# Patient Record
Sex: MALE | Race: WHITE | NOT HISPANIC OR LATINO | Employment: FULL TIME | RURAL
[De-identification: names, ages, dates, MRNs, and addresses within clinical notes are randomized per-mention and may not be internally consistent; named-entity substitution may affect disease eponyms.]

---

## 2020-08-27 ENCOUNTER — HISTORICAL (OUTPATIENT)
Dept: ADMINISTRATIVE | Facility: HOSPITAL | Age: 45
End: 2020-08-27

## 2020-08-27 LAB
ANION GAP SERPL CALCULATED.3IONS-SCNC: 9 MMOL/L
BACTERIA #/AREA URNS HPF: ABNORMAL /HPF
BASOPHILS # BLD AUTO: 0.03 X10E3/UL (ref 0–0.2)
BASOPHILS NFR BLD AUTO: 0.2 % (ref 0–1)
BILIRUB UR QL STRIP: NEGATIVE MG/DL
BUN SERPL-MCNC: 25 MG/DL (ref 7–18)
CALCIUM SERPL-MCNC: 8.2 MG/DL (ref 8.5–10.1)
CHLORIDE SERPL-SCNC: 103 MMOL/L (ref 101–111)
CLARITY UR: ABNORMAL
CLARITY UR: ABNORMAL
CO2 SERPL-SCNC: 30 MMOL/L (ref 21–32)
COLOR UR: YELLOW
COLOR UR: YELLOW
CREAT SERPL-MCNC: 1.1 MG/DL (ref 0.6–1.3)
EOSINOPHIL # BLD AUTO: 0.05 X10E3/UL (ref 0–0.5)
EOSINOPHIL NFR BLD AUTO: 0.4 % (ref 1–4)
ERYTHROCYTE [DISTWIDTH] IN BLOOD BY AUTOMATED COUNT: 12.6 % (ref 11.5–14.5)
GLUCOSE SERPL-MCNC: 147 MG/DL (ref 74–106)
GLUCOSE UR STRIP-MCNC: NORMAL MG/DL
HCT VFR BLD AUTO: 43.9 % (ref 40–54)
HGB BLD-MCNC: 14.7 G/DL (ref 13.5–18)
KETONES UR STRIP-SCNC: NEGATIVE MG/DL
LEUKOCYTE ESTERASE UR QL STRIP: NEGATIVE LEU/UL
LYMPHOCYTES # BLD AUTO: 0.95 X10E3/UL (ref 1–4.8)
LYMPHOCYTES NFR BLD AUTO: 7.8 % (ref 27–41)
MCH RBC QN AUTO: 30.6 PG (ref 27–31)
MCHC RBC AUTO-ENTMCNC: 33.5 G/DL (ref 32–36)
MCV RBC AUTO: 92 FL (ref 80–96)
MONOCYTES # BLD AUTO: 0.66 X10E3/UL (ref 0–0.8)
MONOCYTES NFR BLD AUTO: 5.4 % (ref 2–6)
MPC BLD CALC-MCNC: 9.5 FL (ref 9.4–12.4)
MUCOUS THREADS #/AREA URNS HPF: ABNORMAL /HPF
NEUTROPHILS # BLD AUTO: 10.56 X10E3/UL (ref 1.8–7.7)
NEUTROPHILS NFR BLD AUTO: 86.2 % (ref 53–65)
NITRITE UR QL STRIP: NEGATIVE
PH UR STRIP: 8.5 PH UNITS (ref 5–8)
PLATELET # BLD AUTO: 303 X10E3/UL (ref 150–400)
POTASSIUM SERPL-SCNC: 3.8 MMOL/L (ref 3.6–5)
PROT UR QL STRIP: 30 MG/DL
RBC # BLD AUTO: 4.8 X10E6/UL (ref 4.6–6.2)
RBC # UR STRIP: ABNORMAL ERY/UL
RBC #/AREA URNS HPF: ABNORMAL /HPF (ref 0–3)
SODIUM SERPL-SCNC: 138 MMOL/L (ref 135–145)
SP GR UR STRIP: 1.02 (ref 1–1.03)
SQUAMOUS #/AREA URNS LPF: ABNORMAL /LPF
UROBILINOGEN UR STRIP-ACNC: 0.2 MG/DL
WBC # BLD AUTO: 12.25 X10E3/UL (ref 4.5–11)
WBC #/AREA URNS HPF: ABNORMAL /HPF (ref 0–5)

## 2021-04-05 ENCOUNTER — HISTORICAL (OUTPATIENT)
Dept: ADMINISTRATIVE | Facility: HOSPITAL | Age: 46
End: 2021-04-05

## 2022-01-04 ENCOUNTER — OFFICE VISIT (OUTPATIENT)
Dept: FAMILY MEDICINE | Facility: CLINIC | Age: 47
End: 2022-01-04
Payer: OTHER GOVERNMENT

## 2022-01-04 VITALS
HEIGHT: 65 IN | DIASTOLIC BLOOD PRESSURE: 84 MMHG | BODY MASS INDEX: 22.49 KG/M2 | HEART RATE: 68 BPM | SYSTOLIC BLOOD PRESSURE: 126 MMHG | WEIGHT: 135 LBS | OXYGEN SATURATION: 98 % | RESPIRATION RATE: 17 BRPM

## 2022-01-04 DIAGNOSIS — J06.9 UPPER RESPIRATORY TRACT INFECTION, UNSPECIFIED TYPE: Primary | ICD-10-CM

## 2022-01-04 DIAGNOSIS — R05.9 COUGH: ICD-10-CM

## 2022-01-04 DIAGNOSIS — R52 BODY ACHES: ICD-10-CM

## 2022-01-04 LAB
CTP QC/QA: YES
FLUAV AG NPH QL: NEGATIVE
FLUBV AG NPH QL: NEGATIVE
SARS-COV-2 AG RESP QL IA.RAPID: NEGATIVE

## 2022-01-04 PROCEDURE — 96372 THER/PROPH/DIAG INJ SC/IM: CPT | Mod: ,,, | Performed by: NURSE PRACTITIONER

## 2022-01-04 PROCEDURE — 96372 PR INJECTION,THERAP/PROPH/DIAG2ST, IM OR SUBCUT: ICD-10-PCS | Mod: ,,, | Performed by: NURSE PRACTITIONER

## 2022-01-04 PROCEDURE — 99203 PR OFFICE/OUTPT VISIT, NEW, LEVL III, 30-44 MIN: ICD-10-PCS | Mod: 25,,, | Performed by: NURSE PRACTITIONER

## 2022-01-04 PROCEDURE — 87428 SARSCOV & INF VIR A&B AG IA: CPT | Mod: QW,,, | Performed by: NURSE PRACTITIONER

## 2022-01-04 PROCEDURE — 99203 OFFICE O/P NEW LOW 30 MIN: CPT | Mod: 25,,, | Performed by: NURSE PRACTITIONER

## 2022-01-04 PROCEDURE — 87428 POCT SARS-COV2 (COVID) WITH FLU ANTIGEN: ICD-10-PCS | Mod: QW,,, | Performed by: NURSE PRACTITIONER

## 2022-01-04 RX ORDER — AZITHROMYCIN 250 MG/1
TABLET, FILM COATED ORAL
Qty: 6 TABLET | Refills: 0 | Status: SHIPPED | OUTPATIENT
Start: 2022-01-04 | End: 2022-01-09

## 2022-01-04 RX ORDER — DEXAMETHASONE SODIUM PHOSPHATE 4 MG/ML
4 INJECTION, SOLUTION INTRA-ARTICULAR; INTRALESIONAL; INTRAMUSCULAR; INTRAVENOUS; SOFT TISSUE
Status: COMPLETED | OUTPATIENT
Start: 2022-01-04 | End: 2022-01-04

## 2022-01-04 RX ORDER — CEFTRIAXONE 1 G/1
1 INJECTION, POWDER, FOR SOLUTION INTRAMUSCULAR; INTRAVENOUS ONCE
Status: COMPLETED | OUTPATIENT
Start: 2022-01-04 | End: 2022-01-04

## 2022-01-04 RX ADMIN — CEFTRIAXONE 1 G: 1 INJECTION, POWDER, FOR SOLUTION INTRAMUSCULAR; INTRAVENOUS at 10:01

## 2022-01-04 RX ADMIN — DEXAMETHASONE SODIUM PHOSPHATE 4 MG: 4 INJECTION, SOLUTION INTRA-ARTICULAR; INTRALESIONAL; INTRAMUSCULAR; INTRAVENOUS; SOFT TISSUE at 10:01

## 2022-01-04 NOTE — PROGRESS NOTES
CAROLE Dias   [address] [place]  [address]      PATIENT NAME: Brooks Retana  : 1975  DATE: 22  MRN: [id number]      Billing Provider: CAROLE Dias  Level of Service:   Patient PCP Information     Provider PCP Type    CAROLE Dias General          Reason for Visit / Chief Complaint: Generalized Body Aches (Started this morning) and Cough (Mild, dry cough)       Update PCP  Update Chief Complaint         History of Present Illness / Problem Focused Workflow     Brooks Retana presents to the clinic with Generalized Body Aches (Started this morning) and Cough (Mild, dry cough)     Patient presents to clinic with c/o cough, congestion, and body aches starting yesterday.       Review of Systems     @Review of Systems   HENT: Positive for nasal congestion, postnasal drip, rhinorrhea and sinus pressure/congestion. Negative for ear pain.    Respiratory: Negative for cough.    Cardiovascular: Negative for chest pain.   Neurological: Positive for headaches.       Medical / Social / Family History     Past Medical History:   Diagnosis Date    Kidney stones        History reviewed. No pertinent surgical history.    Social History    reports that he has never smoked. He has never used smokeless tobacco. He reports current alcohol use. He reports that he does not use drugs.    Family History  's family history includes Cancer in his mother.    Medications and Allergies     Medications  No outpatient medications have been marked as taking for the 22 encounter (Office Visit) with CAROLE Dias.     Current Facility-Administered Medications for the 22 encounter (Office Visit) with CAROLE Dias   Medication Dose Route Frequency Provider Last Rate Last Admin    cefTRIAXone injection 1 g  1 g Intramuscular Once CAROLE Dias        dexamethasone injection 4 mg  4 mg Intramuscular 1 time in Clinic/HOD CAROLE Dias            Allergies  Review of patient's allergies indicates:  No Known Allergies    Physical Examination     Vitals:    01/04/22 0829   BP: 126/84   Pulse: 68   Resp: 17     Physical Exam  Constitutional:       General: He is not in acute distress.     Appearance: Normal appearance.   HENT:      Right Ear: Tympanic membrane is bulging.      Left Ear: Tympanic membrane is erythematous and bulging.      Mouth/Throat:      Pharynx: Posterior oropharyngeal erythema present.   Cardiovascular:      Rate and Rhythm: Normal rate and regular rhythm.   Pulmonary:      Effort: Pulmonary effort is normal. No respiratory distress.      Breath sounds: Normal breath sounds. No wheezing, rhonchi or rales.   Skin:     General: Skin is warm and dry.   Neurological:      Mental Status: He is alert.               Lab Results   Component Value Date    WBC 12.25 (H) 08/27/2020    HGB 14.7 08/27/2020    HCT 43.9 08/27/2020    MCV 92 08/27/2020     08/27/2020          Sodium   Date Value Ref Range Status   08/27/2020 138.0 135.0 - 145.0 mmol/L      Potassium   Date Value Ref Range Status   08/27/2020 3.8 3.6 - 5.0 mmol/L      Chloride   Date Value Ref Range Status   08/27/2020 103.0 101.0 - 111.0 mmol/L      CO2   Date Value Ref Range Status   08/27/2020 30 21 - 32 mmol/L      Glucose   Date Value Ref Range Status   08/27/2020 147 (H) 74 - 106 mg/dL      BUN   Date Value Ref Range Status   08/27/2020 25 (H) 7 - 18 mg/dL      Creatinine   Date Value Ref Range Status   08/27/2020 1.1 0.6 - 1.3 mg/dL      Calcium   Date Value Ref Range Status   08/27/2020 8.2 (L) 8.5 - 10.1 mg/dL      Anion Gap   Date Value Ref Range Status   08/27/2020 9       eGFR    Date Value Ref Range Status   08/27/2020 93       Comment:     The above 11 analytes were performed by [facility] [address] [place] [address]     eGFR   Date Value Ref Range Status   08/27/2020 77        X-Ray KUB  Narrative: History: Left sided abdominal pain. Flank  pain    Date: 4/5/2021     Study: KUB    Comparison exam: No previous    Bowel gas pattern is nonobstructive without gross mass lesion. There is  a mild to moderate amount of retained stool in the colon. There is an 8  mm calculus overlying the lower left kidney. Phleboliths overlie the  pelvis. There is no acute osseous abnormality  Impression: Impression: Left nephrolithiasis    This report has been electronically signed by Nhan Valdez     Procedures   Assessment and Plan (including Health Maintenance)      Problem List  Smart Sets  Document Outside HM   :    Plan:           Problem List Items Addressed This Visit    None     Visit Diagnoses     Upper respiratory tract infection, unspecified type    -  Primary    Relevant Medications    cefTRIAXone injection 1 g (Start on 1/4/2022 11:00 AM)    dexamethasone injection 4 mg (Start on 1/4/2022 10:00 AM)    azithromycin (Z-GILL) 250 MG tablet    Cough        Relevant Orders    POCT SARS-COV2 (COVID) with Flu Antigen (Completed)    Body aches        Relevant Orders    POCT SARS-COV2 (COVID) with Flu Antigen (Completed)          Health Maintenance Topics with due status: Not Due       Topic Last Completion Date    TETANUS VACCINE 02/09/2013       No future appointments.     Health Maintenance Due   Topic Date Due    Hepatitis C Screening  Never done    Lipid Panel  Never done    HIV Screening  Never done    Colorectal Cancer Screening  Never done    COVID-19 Vaccine (3 - Booster for Moderna series) 10/02/2021        Follow up if symptoms worsen or fail to improve.     Signature:  CAROLE Dias  [facility]  [address] [address]  [place]  [address]    Date of encounter: 1/4/22

## 2022-01-13 ENCOUNTER — OFFICE VISIT (OUTPATIENT)
Dept: FAMILY MEDICINE | Facility: CLINIC | Age: 47
End: 2022-01-13
Payer: OTHER GOVERNMENT

## 2022-01-13 VITALS
OXYGEN SATURATION: 97 % | SYSTOLIC BLOOD PRESSURE: 122 MMHG | BODY MASS INDEX: 22.49 KG/M2 | WEIGHT: 135 LBS | TEMPERATURE: 96 F | DIASTOLIC BLOOD PRESSURE: 84 MMHG | RESPIRATION RATE: 16 BRPM | HEIGHT: 65 IN | HEART RATE: 71 BPM

## 2022-01-13 DIAGNOSIS — R09.81 HEAD CONGESTION: ICD-10-CM

## 2022-01-13 DIAGNOSIS — J06.9 UPPER RESPIRATORY TRACT INFECTION, UNSPECIFIED TYPE: Primary | ICD-10-CM

## 2022-01-13 DIAGNOSIS — R09.81 SINUS CONGESTION: ICD-10-CM

## 2022-01-13 DIAGNOSIS — R51.9 ACUTE NONINTRACTABLE HEADACHE, UNSPECIFIED HEADACHE TYPE: ICD-10-CM

## 2022-01-13 LAB
CTP QC/QA: YES
SARS-COV-2 AG RESP QL IA.RAPID: NEGATIVE

## 2022-01-13 PROCEDURE — 87426 SARSCOV CORONAVIRUS AG IA: CPT | Mod: QW,,, | Performed by: NURSE PRACTITIONER

## 2022-01-13 PROCEDURE — 87426 SARS CORONAVIRUS 2 ANTIGEN POCT: ICD-10-PCS | Mod: QW,,, | Performed by: NURSE PRACTITIONER

## 2022-01-13 PROCEDURE — 96372 PR INJECTION,THERAP/PROPH/DIAG2ST, IM OR SUBCUT: ICD-10-PCS | Mod: ,,, | Performed by: NURSE PRACTITIONER

## 2022-01-13 PROCEDURE — 96372 THER/PROPH/DIAG INJ SC/IM: CPT | Mod: ,,, | Performed by: NURSE PRACTITIONER

## 2022-01-13 PROCEDURE — 99213 OFFICE O/P EST LOW 20 MIN: CPT | Mod: 25,,, | Performed by: NURSE PRACTITIONER

## 2022-01-13 PROCEDURE — 99213 PR OFFICE/OUTPT VISIT, EST, LEVL III, 20-29 MIN: ICD-10-PCS | Mod: 25,,, | Performed by: NURSE PRACTITIONER

## 2022-01-13 RX ORDER — METHYLPREDNISOLONE ACETATE 40 MG/ML
40 INJECTION, SUSPENSION INTRA-ARTICULAR; INTRALESIONAL; INTRAMUSCULAR; SOFT TISSUE
Status: COMPLETED | OUTPATIENT
Start: 2022-01-13 | End: 2022-01-13

## 2022-01-13 RX ORDER — FEXOFENADINE HCL AND PSEUDOEPHEDRINE HCI 180; 240 MG/1; MG/1
1 TABLET, EXTENDED RELEASE ORAL DAILY
Qty: 10 TABLET | Refills: 0 | Status: SHIPPED | OUTPATIENT
Start: 2022-01-13 | End: 2022-01-23

## 2022-01-13 RX ORDER — METHYLPREDNISOLONE 4 MG/1
TABLET ORAL
Qty: 21 EACH | Refills: 0 | Status: SHIPPED | OUTPATIENT
Start: 2022-01-13 | End: 2022-02-03

## 2022-01-13 RX ORDER — METHYLPREDNISOLONE ACETATE 80 MG/ML
80 INJECTION, SUSPENSION INTRA-ARTICULAR; INTRALESIONAL; INTRAMUSCULAR; SOFT TISSUE
Status: DISCONTINUED | OUTPATIENT
Start: 2022-01-13 | End: 2022-01-13

## 2022-01-13 RX ORDER — CEFDINIR 300 MG/1
300 CAPSULE ORAL 2 TIMES DAILY
Qty: 20 CAPSULE | Refills: 0 | Status: SHIPPED | OUTPATIENT
Start: 2022-01-13 | End: 2022-01-23

## 2022-01-13 RX ORDER — LINCOMYCIN HYDROCHLORIDE 300 MG/ML
600 INJECTION, SOLUTION INTRAMUSCULAR; INTRAVENOUS; SUBCONJUNCTIVAL
Status: COMPLETED | OUTPATIENT
Start: 2022-01-13 | End: 2022-01-13

## 2022-01-13 RX ADMIN — LINCOMYCIN HYDROCHLORIDE 600 MG: 300 INJECTION, SOLUTION INTRAMUSCULAR; INTRAVENOUS; SUBCONJUNCTIVAL at 09:01

## 2022-01-13 RX ADMIN — METHYLPREDNISOLONE ACETATE 40 MG: 40 INJECTION, SUSPENSION INTRA-ARTICULAR; INTRALESIONAL; INTRAMUSCULAR; SOFT TISSUE at 09:01

## 2022-01-13 NOTE — PROGRESS NOTES
CAROLE Wilkes   [place] [address] [address]  [phone]      PATIENT NAME: Brooks Retana  : 1975  DATE: 22  MRN: [id number]      Billing Provider: CAROLE Wilkes  Level of Service:   Patient PCP Information     Provider PCP Type    CAROLE Dias General          Reason for Visit / Chief Complaint: Nasal Congestion, Headache (Since yesterday, will no go away.), Cough (Cough last week, has gotten better, tested for COVID and Flu last week but was negative. Was treated for URI with a shot and Zpak, has completed that.), Otalgia, and Sore Throat       Update PCP  Update Chief Complaint         History of Present Illness / Problem Focused Workflow       Presents with complaints of head and nasal congestion, headache, cough, ear congestion, sore throat for about a week  He was treated for upper respiratory infection about a week ago and has not completely gotten over symptoms  Has concerns of covid exposure       Review of Systems     Review of Systems   Constitutional: Positive for fatigue. Negative for chills and fever.   HENT: Positive for congestion and sore throat. Negative for rhinorrhea.    Eyes: Negative for discharge.   Respiratory: Positive for cough. Negative for shortness of breath.    Cardiovascular: Negative for palpitations.   Gastrointestinal: Negative for abdominal pain, diarrhea and nausea.   Musculoskeletal: Negative for gait problem.   Skin: Negative for rash.   Neurological: Positive for headaches. Negative for dizziness and weakness.   Hematological: Negative for adenopathy.   Psychiatric/Behavioral: Negative for dysphoric mood. The patient is not nervous/anxious.        Medical / Social / Family History     Past Medical History:   Diagnosis Date    Kidney stones        History reviewed. No pertinent surgical history.    Social History    reports that he has never smoked. He has never used smokeless tobacco. He  reports current alcohol use. He reports that he does not use drugs.    Family History  's family history includes Cancer in his mother.    Medications and Allergies     Medications  No outpatient medications have been marked as taking for the 1/13/22 encounter (Office Visit) with CAROLE Wilkes.     Current Facility-Administered Medications for the 1/13/22 encounter (Office Visit) with CAROLE Wilkes   Medication Dose Route Frequency Provider Last Rate Last Admin    [COMPLETED] lincomycin injection 600 mg  600 mg Intramuscular 1 time in Clinic/HOD TITO WilkesP   600 mg at 01/13/22 0940    [COMPLETED] methylPREDNISolone acetate injection 40 mg  40 mg Intramuscular 1 time in Clinic/HOD TITO WilkesP   40 mg at 01/13/22 0940    [DISCONTINUED] methylPREDNISolone acetate injection 80 mg  80 mg Intramuscular 1 time in Clinic/HOD CAROLE Wilkes           Allergies  Review of patient's allergies indicates:  No Known Allergies    Physical Examination     Vitals:    01/13/22 0821   BP: 122/84   Pulse: 71   Resp: 16   Temp: 96.1 °F (35.6 °C)     Physical Exam  Constitutional:       General: He is not in acute distress.  HENT:      Head: Normocephalic.      Right Ear: Tympanic membrane normal.      Left Ear: Tympanic membrane normal.      Nose: Congestion present.      Mouth/Throat:      Mouth: Mucous membranes are moist.      Pharynx: Posterior oropharyngeal erythema present.   Eyes:      Extraocular Movements: Extraocular movements intact.   Cardiovascular:      Rate and Rhythm: Normal rate.      Heart sounds: Normal heart sounds.   Pulmonary:      Effort: Pulmonary effort is normal. No respiratory distress.      Breath sounds: No wheezing.   Abdominal:      General: Bowel sounds are normal.   Musculoskeletal:         General: Normal range of motion.      Cervical back: Normal range of motion.   Skin:     General: Skin is warm.   Neurological:      Mental Status: He is alert and oriented to  person, place, and time.   Psychiatric:         Mood and Affect: Mood normal.           Imaging / Labs       Office Visit on 01/13/2022   Component Date Value Ref Range Status    SARS Coronavirus 2 Antigen 01/13/2022 Negative  Negative Final     Acceptable 01/13/2022 Yes   Final       Assessment and Plan (including Health Maintenance)      Problem List  Smart Sets  Document Outside HM   :    Health Maintenance Due   Topic Date Due    Hepatitis C Screening  Never done    Lipid Panel  Never done    HIV Screening  Never done    Colorectal Cancer Screening  Never done    COVID-19 Vaccine (3 - Booster for Moderna series) 10/02/2021       Problem List Items Addressed This Visit        ENT    Upper respiratory tract infection - Primary    Relevant Medications    lincomycin injection 600 mg (Completed)    methylPREDNISolone (MEDROL DOSEPACK) 4 mg tablet    cefdinir (OMNICEF) 300 MG capsule    fexofenadine-pseudoephedrine (ALLEGRA-D 24) 180-240 mg per 24 hr tablet    methylPREDNISolone acetate injection 40 mg (Completed)      Other Visit Diagnoses     Sinus congestion        Relevant Medications    methylPREDNISolone (MEDROL DOSEPACK) 4 mg tablet    fexofenadine-pseudoephedrine (ALLEGRA-D 24) 180-240 mg per 24 hr tablet    Other Relevant Orders    SARS Coronavirus 2 Antigen, POCT (Completed)    Acute nonintractable headache, unspecified headache type        Relevant Orders    SARS Coronavirus 2 Antigen, POCT (Completed)    Head congestion        Relevant Medications    methylPREDNISolone (MEDROL DOSEPACK) 4 mg tablet    fexofenadine-pseudoephedrine (ALLEGRA-D 24) 180-240 mg per 24 hr tablet          Health Maintenance Topics with due status: Not Due       Topic Last Completion Date    TETANUS VACCINE 02/09/2013           Signature:  CAROLE Wilkes  [address] [address]  [phone]    Date of encounter: 1/13/22

## 2022-01-14 PROBLEM — J06.9 UPPER RESPIRATORY TRACT INFECTION: Status: ACTIVE | Noted: 2022-01-14

## 2022-08-10 ENCOUNTER — OFFICE VISIT (OUTPATIENT)
Dept: FAMILY MEDICINE | Facility: CLINIC | Age: 47
End: 2022-08-10
Payer: OTHER GOVERNMENT

## 2022-08-10 ENCOUNTER — HOSPITAL ENCOUNTER (OUTPATIENT)
Dept: RADIOLOGY | Facility: HOSPITAL | Age: 47
Discharge: HOME | End: 2022-08-10
Attending: FAMILY MEDICINE
Payer: OTHER GOVERNMENT

## 2022-08-10 VITALS
TEMPERATURE: 98 F | SYSTOLIC BLOOD PRESSURE: 112 MMHG | DIASTOLIC BLOOD PRESSURE: 74 MMHG | HEART RATE: 68 BPM | WEIGHT: 135.19 LBS | OXYGEN SATURATION: 98 % | RESPIRATION RATE: 20 BRPM | HEIGHT: 65 IN | BODY MASS INDEX: 22.52 KG/M2

## 2022-08-10 DIAGNOSIS — N20.0 KIDNEY STONES: ICD-10-CM

## 2022-08-10 DIAGNOSIS — M54.50 ACUTE LEFT-SIDED LOW BACK PAIN WITHOUT SCIATICA: Primary | ICD-10-CM

## 2022-08-10 DIAGNOSIS — R10.32 LEFT LOWER QUADRANT ABDOMINAL PAIN: ICD-10-CM

## 2022-08-10 DIAGNOSIS — M54.50 ACUTE LEFT-SIDED LOW BACK PAIN WITHOUT SCIATICA: ICD-10-CM

## 2022-08-10 LAB
BILIRUB SERPL-MCNC: ABNORMAL MG/DL
BLOOD URINE, POC: ABNORMAL
COLOR, POC UA: YELLOW
GLUCOSE UR QL STRIP: NEGATIVE
KETONES UR QL STRIP: 80
LEUKOCYTE ESTERASE URINE, POC: NEGA T IVE
NITRITE, POC UA: NEGATIVE
PH, POC UA: 6
PROTEIN, POC: ABNORMAL
SPECIFIC GRAVITY, POC UA: 1.02
UROBILINOGEN, POC UA: 0.2

## 2022-08-10 PROCEDURE — 74176 CT ABD & PELVIS W/O CONTRAST: CPT | Mod: TC

## 2022-08-10 PROCEDURE — 99214 OFFICE O/P EST MOD 30 MIN: CPT | Mod: ,,, | Performed by: FAMILY MEDICINE

## 2022-08-10 PROCEDURE — 99214 PR OFFICE/OUTPT VISIT, EST, LEVL IV, 30-39 MIN: ICD-10-PCS | Mod: ,,, | Performed by: FAMILY MEDICINE

## 2022-08-10 PROCEDURE — 81003 URINALYSIS AUTO W/O SCOPE: CPT | Mod: QW,,, | Performed by: FAMILY MEDICINE

## 2022-08-10 PROCEDURE — 81003 POCT URINALYSIS W/O SCOPE: ICD-10-PCS | Mod: QW,,, | Performed by: FAMILY MEDICINE

## 2022-08-10 RX ORDER — KETOROLAC TROMETHAMINE 10 MG/1
10 TABLET, FILM COATED ORAL EVERY 6 HOURS
Qty: 20 TABLET | Refills: 0 | Status: SHIPPED | OUTPATIENT
Start: 2022-08-10 | End: 2022-08-15

## 2022-08-10 RX ORDER — TAMSULOSIN HYDROCHLORIDE 0.4 MG/1
0.4 CAPSULE ORAL DAILY
Qty: 30 CAPSULE | Refills: 3 | Status: SHIPPED | OUTPATIENT
Start: 2022-08-10 | End: 2023-01-27 | Stop reason: SDUPTHER

## 2022-08-10 NOTE — PROGRESS NOTES
Mendez Richards DO   [facility]  [address] [facility] [address]  [place]  [address]      PATIENT NAME: Brooks Retana  : 1975  DATE: 8/10/22  MRN: [id number]      Billing Provider: Mendez Richards DO  Level of Service:   Patient PCP Information     Provider PCP Type    CAROLE Dias General          Reason for Visit / Chief Complaint: Low-back Pain (Left low back pain x 1 week;He has a history of kidney stones with lithotripsy multiple times in the last 3 years. He did notice some blood in his urine last week. )       Update PCP  Update Chief Complaint         History of Present Illness / Problem Focused Workflow     Brooks Retana presents to the clinic with Low-back Pain (Left low back pain x 1 week;He has a history of kidney stones with lithotripsy multiple times in the last 3 years. He did notice some blood in his urine last week. )     Patient presented to the clinic today with pain in his left flank and back area for approximately 1 week.  He has had kidney stones before and at times he feels like he can fill a kidney stone removed on the left.  He does not have severe pain except for short periods of time.  He does have some mild dysuria.  States the pain is similar to what he has had in the past but not as severe especially on the left.  Patient denies any fever chills or sweats.      Review of Systems     Review of Systems   Constitutional: Negative for activity change, appetite change, chills, fatigue and fever.   HENT: Negative for nasal congestion, ear discharge, ear pain, mouth dryness, mouth sores, postnasal drip, sinus pressure/congestion, sore throat and voice change.    Eyes: Negative for pain, discharge, redness, itching and visual disturbance.   Respiratory: Negative for apnea, cough, chest tightness, shortness of breath and wheezing.    Cardiovascular: Negative for chest pain, palpitations and leg swelling.   Gastrointestinal: Positive for abdominal pain and nausea. Negative  for abdominal distention, anal bleeding, blood in stool, change in bowel habit, constipation, diarrhea, vomiting, reflux and change in bowel habit.   Endocrine: Negative for cold intolerance, heat intolerance, polydipsia, polyphagia and polyuria.   Genitourinary: Positive for frequency. Negative for difficulty urinating, enuresis, erectile dysfunction, genital sores, hematuria and urgency.   Musculoskeletal: Negative for arthralgias, back pain, gait problem, leg pain, myalgias and neck pain.   Integumentary:  Negative for rash, mole/lesion, breast mass and breast discharge.   Allergic/Immunologic: Negative for environmental allergies and food allergies.   Neurological: Negative for dizziness, vertigo, tremors, seizures, syncope, facial asymmetry, speech difficulty, weakness, light-headedness, numbness, headaches, disturbances in coordination, memory loss and coordination difficulties.   Hematological: Negative for adenopathy. Does not bruise/bleed easily.   Psychiatric/Behavioral: Negative for agitation, behavioral problems, confusion, decreased concentration, dysphoric mood, hallucinations, self-injury, sleep disturbance and suicidal ideas. The patient is not nervous/anxious and is not hyperactive.    Breast: Negative for mass      Medical / Social / Family History     Past Medical History:   Diagnosis Date    Kidney stones        Past Surgical History:   Procedure Laterality Date    LITHOTRIPSY         Social History    reports that he has never smoked. He has never used smokeless tobacco. He reports current alcohol use. He reports that he does not use drugs.    Family History  's family history includes Breast cancer in his mother; No Known Problems in his brother, brother, brother, father, sister, sister, and son.    Medications and Allergies     Medications  No outpatient medications have been marked as taking for the 8/10/22 encounter (Office Visit) with Mendez Richards DO.       Allergies  Review of  patient's allergies indicates:  No Known Allergies    Physical Examination     Vitals:    08/10/22 0855   BP: 112/74   Pulse: 68   Resp: 20   Temp: 97.9 °F (36.6 °C)     Physical Exam  Constitutional:       General: He is not in acute distress.     Appearance: Normal appearance. He is normal weight. He is not ill-appearing.   HENT:      Head: Normocephalic.      Nose: Nose normal.      Mouth/Throat:      Mouth: Mucous membranes are moist.      Pharynx: Oropharynx is clear. No oropharyngeal exudate or posterior oropharyngeal erythema.   Eyes:      General: No scleral icterus.        Right eye: No discharge.         Left eye: No discharge.      Conjunctiva/sclera: Conjunctivae normal.      Pupils: Pupils are equal, round, and reactive to light.   Cardiovascular:      Rate and Rhythm: Normal rate and regular rhythm.      Pulses: Normal pulses.      Heart sounds: Normal heart sounds. No murmur heard.  Pulmonary:      Effort: Pulmonary effort is normal.      Breath sounds: Normal breath sounds. No wheezing.   Abdominal:      General: Abdomen is flat. Bowel sounds are normal.      Palpations: There is no mass.      Tenderness: There is no abdominal tenderness. There is no guarding.      Hernia: No hernia is present.   Musculoskeletal:         General: Normal range of motion.      Right lower leg: No edema.      Left lower leg: No edema.   Skin:     General: Skin is warm.      Capillary Refill: Capillary refill takes less than 2 seconds.      Findings: No rash.   Neurological:      General: No focal deficit present.      Mental Status: He is alert and oriented to person, place, and time. Mental status is at baseline.   Psychiatric:         Mood and Affect: Mood normal.         Behavior: Behavior normal.         Thought Content: Thought content normal.         Judgment: Judgment normal.               Lab Results   Component Value Date    WBC 12.25 (H) 08/27/2020    HGB 14.7 08/27/2020    HCT 43.9 08/27/2020    MCV 92  08/27/2020     08/27/2020          Sodium   Date Value Ref Range Status   08/27/2020 138.0 135.0 - 145.0 mmol/L      Potassium   Date Value Ref Range Status   08/27/2020 3.8 3.6 - 5.0 mmol/L      Chloride   Date Value Ref Range Status   08/27/2020 103.0 101.0 - 111.0 mmol/L      CO2   Date Value Ref Range Status   08/27/2020 30 21 - 32 mmol/L      Glucose   Date Value Ref Range Status   08/27/2020 147 (H) 74 - 106 mg/dL      BUN   Date Value Ref Range Status   08/27/2020 25 (H) 7 - 18 mg/dL      Creatinine   Date Value Ref Range Status   08/27/2020 1.1 0.6 - 1.3 mg/dL      Calcium   Date Value Ref Range Status   08/27/2020 8.2 (L) 8.5 - 10.1 mg/dL      Anion Gap   Date Value Ref Range Status   08/27/2020 9       eGFR    Date Value Ref Range Status   08/27/2020 93       Comment:     The above 11 analytes were performed by [facility] [address]     eGFR   Date Value Ref Range Status   08/27/2020 77        X-Ray KUB  Narrative: History: Left sided abdominal pain. Flank pain    Date: 4/5/2021     Study: KUB    Comparison exam: No previous    Bowel gas pattern is nonobstructive without gross mass lesion. There is  a mild to moderate amount of retained stool in the colon. There is an 8  mm calculus overlying the lower left kidney. Phleboliths overlie the  pelvis. There is no acute osseous abnormality  Impression: Impression: Left nephrolithiasis    This report has been electronically signed by Nhan Valdez     Procedures   Assessment and Plan (including Health Maintenance)      Problem List  Smart Sets  Document Outside HM   :    Plan:         Health Maintenance Due   Topic Date Due    Hepatitis C Screening  Never done    Lipid Panel  Never done    HIV Screening  Never done    Colorectal Cancer Screening  Never done    COVID-19 Vaccine (3 - Booster for Moderna series) 09/02/2021       Problem List Items Addressed This Visit        Renal/    Kidney stones    Relevant  Medications    ketorolac (TORADOL) 10 mg tablet    tamsulosin (FLOMAX) 0.4 mg Cap    Other Relevant Orders    CT Abdomen Pelvis  Without Contrast       Orthopedic    Acute left-sided low back pain without sciatica - Primary    Current Assessment & Plan      patient with history of kidney stones and has been having blood in his urine.  Pain is primarily on the left so we will obtain CT scan of his abdomen for renal colic.  Urine did show too numerous to count red blood cells..  Will start him on Toradol 10 mg every 6-8 hours as needed for pain.  Flomax 0.4 twice daily.  He is to follow up after the CT is complete.  Patient may be referred to Urology for lithotripsy           Relevant Medications    ketorolac (TORADOL) 10 mg tablet    tamsulosin (FLOMAX) 0.4 mg Cap    Other Relevant Orders    POCT URINALYSIS W/O SCOPE (Completed)    CT Abdomen Pelvis  Without Contrast      Other Visit Diagnoses     Left lower quadrant abdominal pain        Relevant Orders    CT Abdomen Pelvis  Without Contrast          Health Maintenance Topics with due status: Not Due       Topic Last Completion Date    TETANUS VACCINE 02/09/2013    Influenza Vaccine 11/06/2021       No future appointments.     Follow up in about 4 weeks (around 9/7/2022), or if symptoms worsen or fail to improve.     Signature:  Mendez Richards DO  [address] [place]  [address]    Date of encounter: 8/10/22

## 2022-08-10 NOTE — ASSESSMENT & PLAN NOTE
patient with history of kidney stones and has been having blood in his urine.  Pain is primarily on the left so we will obtain CT scan of his abdomen for renal colic.  Urine did show too numerous to count red blood cells..  Will start him on Toradol 10 mg every 6-8 hours as needed for pain.  Flomax 0.4 twice daily.  He is to follow up after the CT is complete.  Patient may be referred to Urology for lithotripsy

## 2022-08-10 NOTE — PROGRESS NOTES
Urology referral made after discussing CT results with patient.He is established with Dr. Hernandez in [place].

## 2022-11-16 RX ORDER — OSELTAMIVIR PHOSPHATE 75 MG/1
75 CAPSULE ORAL 2 TIMES DAILY
Qty: 10 CAPSULE | Refills: 0 | Status: SHIPPED | OUTPATIENT
Start: 2022-11-16 | End: 2022-11-21

## 2023-01-03 ENCOUNTER — OFFICE VISIT (OUTPATIENT)
Dept: FAMILY MEDICINE | Facility: CLINIC | Age: 48
End: 2023-01-03
Payer: OTHER GOVERNMENT

## 2023-01-03 VITALS
RESPIRATION RATE: 18 BRPM | DIASTOLIC BLOOD PRESSURE: 74 MMHG | WEIGHT: 135 LBS | HEART RATE: 78 BPM | HEIGHT: 65 IN | SYSTOLIC BLOOD PRESSURE: 110 MMHG | OXYGEN SATURATION: 98 % | BODY MASS INDEX: 22.49 KG/M2 | TEMPERATURE: 99 F

## 2023-01-03 DIAGNOSIS — S81.851A DOG BITE OF RIGHT CALF, INITIAL ENCOUNTER: Primary | ICD-10-CM
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
## 2023-01-03 PROCEDURE — 90471 IMMUNIZATION ADMIN: CPT | Mod: ,,, | Performed by: NURSE PRACTITIONER

## 2023-01-03 PROCEDURE — 90715 TDAP VACCINE GREATER THAN OR EQUAL TO 7YO IM: ICD-10-PCS | Mod: ,,, | Performed by: NURSE PRACTITIONER

## 2023-01-03 PROCEDURE — 90471 TDAP VACCINE GREATER THAN OR EQUAL TO 7YO IM: ICD-10-PCS | Mod: ,,, | Performed by: NURSE PRACTITIONER

## 2023-01-03 PROCEDURE — 99213 PR OFFICE/OUTPT VISIT, EST, LEVL III, 20-29 MIN: ICD-10-PCS | Mod: ,,, | Performed by: NURSE PRACTITIONER

## 2023-01-03 PROCEDURE — 90715 TDAP VACCINE 7 YRS/> IM: CPT | Mod: ,,, | Performed by: NURSE PRACTITIONER

## 2023-01-03 PROCEDURE — 99213 OFFICE O/P EST LOW 20 MIN: CPT | Mod: ,,, | Performed by: NURSE PRACTITIONER

## 2023-01-03 RX ORDER — KETOROLAC TROMETHAMINE 10 MG/1
TABLET, FILM COATED ORAL
COMMUNITY
Start: 2022-08-10 | End: 2023-01-27 | Stop reason: SDUPTHER

## 2023-01-04 RX ORDER — AMOXICILLIN AND CLAVULANATE POTASSIUM 875; 125 MG/1; MG/1
1 TABLET, FILM COATED ORAL EVERY 12 HOURS
Qty: 20 TABLET | Refills: 0 | Status: SHIPPED | OUTPATIENT
Start: 2023-01-04 | End: 2023-05-03 | Stop reason: ALTCHOICE

## 2023-01-04 RX ORDER — MUPIROCIN 20 MG/G
OINTMENT TOPICAL 2 TIMES DAILY
Qty: 30 G | Refills: 1 | Status: SHIPPED | OUTPATIENT
Start: 2023-01-04 | End: 2023-05-03 | Stop reason: ALTCHOICE

## 2023-01-22 PROBLEM — S81.851A DOG BITE OF RIGHT CALF: Status: ACTIVE | Noted: 2023-01-22

## 2023-01-22 PROBLEM — J06.9 UPPER RESPIRATORY TRACT INFECTION: Status: RESOLVED | Noted: 2022-01-14 | Resolved: 2023-01-22
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
## 2023-01-22 NOTE — PROGRESS NOTES
Aixa Hernández NP   [facility]  [address]  [place]  [address]      PATIENT NAME: Brooks Retana  : 1975  DATE: 1/3/23  MRN: [id number]      Billing Provider: Aixa Hernández NP  Level of Service: [place] OFFICE/OUTPT VISIT, EST, LEVL III, 20-29 MIN  Patient PCP Information       Provider PCP Type    CAROLE Dias General            Reason for Visit / Chief Complaint: Animal Bite (Pt c/o dog bite to right calf that occurred about 30 minutes ago while jogging in town. Pt states that he does not personally know the dog but he does know who the owner is. Last tetanus vaccine date unknown but he is pretty sure it has been in the last 10 years since he is in [employer]. )       Update PCP  Update Chief Complaint         History of Present Illness / Problem Focused Workflow     Brooks Retana presents to the clinic with Animal Bite (Pt c/o dog bite to right calf that occurred about 30 minutes ago while jogging in town. Pt states that he does not personally know the dog but he does know who the owner is. Last tetanus vaccine date unknown but he is pretty sure it has been in the last 10 years since he is in the [employer]. )     47 year old male presents to clinic with complaints of dog bite to right calf. States he was bitten by a neighbor's dog about 30 mins ago while jogging in town.  Pt states that he does not personally know the dog but he does know who the owner is. Owner reported dog is up to date on rabies vaccine. Patient reports he is unsure of last tetanus vaccine date.       Review of Systems     @Review of Systems   Constitutional:  Negative for activity change, appetite change, fatigue and fever.   HENT:  Negative for nasal congestion, ear pain, rhinorrhea, sinus pressure/congestion and sore throat.    Eyes:  Negative for pain, redness, visual disturbance and eye dryness.   Respiratory:  Negative for cough and shortness of breath.    Cardiovascular:  Negative for  chest pain and leg swelling.   Gastrointestinal:  Negative for abdominal distention, abdominal pain, constipation and diarrhea.   Endocrine: Negative for cold intolerance, heat intolerance and polyuria.   Genitourinary:  Negative for bladder incontinence, dysuria, frequency and urgency.   Musculoskeletal:  Negative for arthralgias, gait problem and myalgias.   Integumentary:  Positive for wound. Negative for color change and rash.   Allergic/Immunologic: Negative for environmental allergies and food allergies.   Neurological:  Negative for dizziness, weakness, light-headedness and headaches.   Psychiatric/Behavioral:  Negative for behavioral problems and sleep disturbance.      Medical / Social / Family History     Past Medical History:   Diagnosis Date    Kidney stones        Past Surgical History:   Procedure Laterality Date    LITHOTRIPSY         Social History    reports that he has never smoked. He has never been exposed to tobacco smoke. He has never used smokeless tobacco. He reports current alcohol use. He reports that he does not use drugs.    Family History  's family history includes Breast cancer in his mother; No Known Problems in his brother, brother, brother, father, sister, sister, and son.    Medications and Allergies     Medications  Outpatient Medications Marked as Taking for the 1/3/23 encounter (Office Visit) with Aixa Hernández NP   Medication Sig Dispense Refill    tamsulosin (FLOMAX) 0.4 mg Cap Take 1 capsule (0.4 mg total) by mouth once daily. 30 capsule 3       Allergies  Review of patient's allergies indicates:  No Known Allergies    Physical Examination     Vitals:    01/03/23 1450   BP: 110/74   Pulse: 78   Resp: 18   Temp: 98.7 °F (37.1 °C)     Physical Exam  Vitals and nursing note reviewed.   HENT:      Head: Normocephalic.      Right Ear: Tympanic membrane normal.      Left Ear: Tympanic membrane normal.      Nose: Nose normal.      Mouth/Throat:      Mouth: Mucous membranes  are moist.      Pharynx: Oropharynx is clear. No posterior oropharyngeal erythema.   Eyes:      Conjunctiva/sclera: Conjunctivae normal.   Cardiovascular:      Rate and Rhythm: Normal rate and regular rhythm.      Pulses: Normal pulses.      Heart sounds: Normal heart sounds.   Pulmonary:      Effort: Pulmonary effort is normal.      Breath sounds: Normal breath sounds.   Abdominal:      General: Abdomen is flat. Bowel sounds are normal. There is no distension.      Palpations: Abdomen is soft.   Musculoskeletal:         General: No swelling or tenderness. Normal range of motion.      Cervical back: Normal range of motion.      Right lower leg: No edema.      Left lower leg: No edema.   Skin:     General: Skin is warm and dry.      Capillary Refill: Capillary refill takes less than 2 seconds.      Findings: Wound present.          Neurological:      Mental Status: He is alert. Mental status is at baseline.   Psychiatric:         Mood and Affect: Mood normal.         Behavior: Behavior normal.             Lab Results   Component Value Date    WBC 12.25 (H) 08/27/2020    HGB 14.7 08/27/2020    HCT 43.9 08/27/2020    MCV 92 08/27/2020     08/27/2020          Sodium   Date Value Ref Range Status   08/27/2020 138.0 135.0 - 145.0 mmol/L      Potassium   Date Value Ref Range Status   08/27/2020 3.8 3.6 - 5.0 mmol/L      Chloride   Date Value Ref Range Status   08/27/2020 103.0 101.0 - 111.0 mmol/L      CO2   Date Value Ref Range Status   08/27/2020 30 21 - 32 mmol/L      Glucose   Date Value Ref Range Status   08/27/2020 147 (H) 74 - 106 mg/dL      BUN   Date Value Ref Range Status   08/27/2020 25 (H) 7 - 18 mg/dL      Creatinine   Date Value Ref Range Status   08/27/2020 1.1 0.6 - 1.3 mg/dL      Calcium   Date Value Ref Range Status   08/27/2020 8.2 (L) 8.5 - 10.1 mg/dL      Anion Gap   Date Value Ref Range Status   08/27/2020 9       eGFR    Date Value Ref Range Status   08/27/2020 93        Comment:     The above 11 analytes were performed by [id number] [place] [address]     eGFR   Date Value Ref Range Status   08/27/2020 77        CT Abdomen Pelvis  Without Contrast  Narrative: EXAMINATION:  CT ABDOMEN PELVIS WITHOUT CONTRAST    CLINICAL HISTORY:  Low back pain, unspecifiedFlank pain, kidney stone suspected;    COMPARISON:  CT abdomen pelvis August 27, 2020    TECHNIQUE:  Multiple axial tomographic images of the abdomen and pelvis were obtained without the use of intravenous contrast.    FINDINGS:  Mild dependent changes of the lungs noted.    No worrisome focal hepatic abnormality demonstrated on submitted images.  Gallbladder appears somewhat contracted.  Visualized pancreas appears unremarkable.  Spleen grossly unremarkable.    Several nonobstructing left renal calculi are present.  These are subcentimeter.  One of these is located within the left renal pelvis and measures up to 6.5 mm.  There is no significant hydronephrosis or convincing ureteral calculus.  Bilateral adrenal glands grossly unremarkable.  Urinary bladder incompletely distended.  Prostate and seminal vesicles grossly unremarkable.    No convincing evidence of gastrointestinal obstruction or acute appendicitis.  Small fat containing periumbilical hernia.  Vasculature grossly unremarkable.  Visualized osseous and surrounding soft tissue structures demonstrate no acute abnormality.  Impression: Several nonobstructing left renal calculi are present. These are subcentimeter. One of these is located within the left renal pelvis and measures up to 6.5 mm.  There is no significant hydronephrosis or convincing ureteral calculus.  Small fat containing periumbilical hernia.  Other/detailed findings as above.    The CT exam was performed using one or more of the following dose    reduction techniques- Automated exposure control, adjustment of the mA    and/or kV according to patient size, and/or use of iterative    reconstructed  technique.    Point of Service: [facility]    Electronically signed by: Wilbert Morrison  Date:    08/10/2022  Time:    14:28     Procedures   Assessment and Plan (including Health Maintenance)      Problem List  Smart Sets  Document Outside HM   :    Plan:           Problem List Items Addressed This Visit          Orthopedic    Dog bite of right calf - Primary    Current Assessment & Plan     Wounds cleaned and covered with dry gauze.   Tdap given in clinic.   Augmentin as ordered.   Instructed patient to keep wounds clean and dry. Apply mupirocin as ordered.   Follow up if symptoms of infection such as redness, swelling, warmth, increased pain, or drainage occurs.          Relevant Medications    amoxicillin-clavulanate 875-125mg (AUGMENTIN) 875-125 mg per tablet    mupirocin (BACTROBAN) 2 % ointment    Other Relevant Orders    (In Office Administered) Tdap Vaccine (Completed)       Health Maintenance Topics with due status: Not Due       Topic Last Completion Date    TETANUS VACCINE 01/03/2023       No future appointments.     Health Maintenance Due   Topic Date Due    Hepatitis C Screening  Never done    Lipid Panel  Never done    HIV Screening  Never done    Colorectal Cancer Screening  Never done    COVID-19 Vaccine (3 - Booster for Moderna series) 05/28/2021    Influenza Vaccine (1) 09/01/2022        No follow-ups on file.     Signature:  Aixa Hernández NP  [address] [place]  [address]    Date of encounter: 1/3/23

## 2023-01-22 NOTE — ASSESSMENT & PLAN NOTE
Wounds cleaned and covered with dry gauze.   Tdap given in clinic.   Augmentin as ordered.   Instructed patient to keep wounds clean and dry. Apply mupirocin as ordered.   Follow up if symptoms of infection such as redness, swelling, warmth, increased pain, or drainage occurs.

## 2023-01-27 ENCOUNTER — OFFICE VISIT (OUTPATIENT)
Dept: FAMILY MEDICINE | Facility: CLINIC | Age: 48
End: 2023-01-27
Payer: OTHER GOVERNMENT

## 2023-01-27 VITALS
WEIGHT: 135.38 LBS | TEMPERATURE: 98 F | HEIGHT: 65 IN | RESPIRATION RATE: 18 BRPM | HEART RATE: 63 BPM | OXYGEN SATURATION: 98 % | BODY MASS INDEX: 22.56 KG/M2 | DIASTOLIC BLOOD PRESSURE: 80 MMHG | SYSTOLIC BLOOD PRESSURE: 132 MMHG

## 2023-01-27 DIAGNOSIS — M54.50 ACUTE LEFT-SIDED LOW BACK PAIN WITHOUT SCIATICA: ICD-10-CM

## 2023-01-27 DIAGNOSIS — N20.0 KIDNEY STONES: Primary | ICD-10-CM

## 2023-01-27 LAB
BILIRUB SERPL-MCNC: ABNORMAL MG/DL
BLOOD URINE, POC: ABNORMAL
COLOR, POC UA: YELLOW
GLUCOSE UR QL STRIP: ABNORMAL
KETONES UR QL STRIP: ABNORMAL
LEUKOCYTE ESTERASE URINE, POC: ABNORMAL
NITRITE, POC UA: ABNORMAL
PH, POC UA: 6.5
PROTEIN, POC: ABNORMAL
SPECIFIC GRAVITY, POC UA: 1.02
UROBILINOGEN, POC UA: 0.2

## 2023-01-27 PROCEDURE — 99213 PR OFFICE/OUTPT VISIT, EST, LEVL III, 20-29 MIN: ICD-10-PCS | Mod: 25,,, | Performed by: FAMILY MEDICINE

## 2023-01-27 PROCEDURE — 99213 OFFICE O/P EST LOW 20 MIN: CPT | Mod: 25,,, | Performed by: FAMILY MEDICINE

## 2023-01-27 PROCEDURE — 81003 URINALYSIS AUTO W/O SCOPE: CPT | Mod: QW,,, | Performed by: FAMILY MEDICINE

## 2023-01-27 PROCEDURE — 82365 CALCULUS SPECTROSCOPY: CPT | Mod: 90,,, | Performed by: CLINICAL MEDICAL LABORATORY

## 2023-01-27 PROCEDURE — 96372 THER/PROPH/DIAG INJ SC/IM: CPT | Mod: ,,, | Performed by: FAMILY MEDICINE

## 2023-01-27 PROCEDURE — 81003 POCT URINALYSIS W/O SCOPE: ICD-10-PCS | Mod: QW,,, | Performed by: FAMILY MEDICINE

## 2023-01-27 PROCEDURE — 96372 PR INJECTION,THERAP/PROPH/DIAG2ST, IM OR SUBCUT: ICD-10-PCS | Mod: ,,, | Performed by: FAMILY MEDICINE

## 2023-01-27 PROCEDURE — 82365 URINARY STONE ANALYSIS: ICD-10-PCS | Mod: 90,,, | Performed by: CLINICAL MEDICAL LABORATORY

## 2023-01-27 RX ORDER — TAMSULOSIN HYDROCHLORIDE 0.4 MG/1
0.4 CAPSULE ORAL DAILY
Qty: 30 CAPSULE | Refills: 3 | Status: SHIPPED | OUTPATIENT
Start: 2023-01-27 | End: 2023-05-03

## 2023-01-27 RX ORDER — KETOROLAC TROMETHAMINE 10 MG/1
10 TABLET, FILM COATED ORAL EVERY 6 HOURS PRN
Qty: 20 TABLET | Refills: 2 | Status: SHIPPED | OUTPATIENT
Start: 2023-01-27 | End: 2023-05-03

## 2023-01-27 RX ORDER — KETOROLAC TROMETHAMINE 30 MG/ML
60 INJECTION, SOLUTION INTRAMUSCULAR; INTRAVENOUS
Status: COMPLETED | OUTPATIENT
Start: 2023-01-27 | End: 2023-01-27

## 2023-01-27 RX ADMIN — KETOROLAC TROMETHAMINE 60 MG: 30 INJECTION, SOLUTION INTRAMUSCULAR; INTRAVENOUS at 09:01

## 2023-01-27 NOTE — PROGRESS NOTES
Mendez Richards DO   [address]  [place]  [address]      PATIENT NAME: Brooks Retana  : 1975  DATE: 23  MRN: [id number]      Billing Provider: Mendez Richards DO  Level of Service:   Patient PCP Information       Provider PCP Type    CAROLE Dias General            Reason for Visit / Chief Complaint: Hypertension (Pt c/o having kidney pain in his left kindy. Pt says it started this morning around this morning.)       Update PCP  Update Chief Complaint         History of Present Illness / Problem Focused Workflow     Brooks Retana presents to the clinic with Hypertension (Pt c/o having kidney pain in his left kindy. Pt says it started this morning around this morning.)     Patient with history of hypertension who also has history of kidney stones and complains of pain in his right flank today.  He denies any blood in his urine currently.  Has had several kidney stones and he is had lithotripsy x2.  Patient states similar to the pain he is had in the past.  States that Toradol normally stops his pain.  He did start taking Flomax this morning but is out of the medication    Hypertension  Pertinent negatives include no chest pain, headaches, neck pain, palpitations or shortness of breath.     Review of Systems     Review of Systems   Constitutional:  Negative for activity change, appetite change, chills, fatigue and fever.   HENT:  Negative for nasal congestion, ear discharge, ear pain, mouth dryness, mouth sores, postnasal drip, sinus pressure/congestion, sore throat and voice change.    Eyes:  Negative for pain, discharge, redness, itching and visual disturbance.   Respiratory:  Negative for apnea, cough, chest tightness, shortness of breath and wheezing.    Cardiovascular:  Negative for chest pain, palpitations and leg swelling.   Gastrointestinal:  Negative for abdominal distention, abdominal pain, anal bleeding, blood in stool, change in bowel habit,  constipation, diarrhea, nausea, vomiting, reflux and change in bowel habit.   Endocrine: Negative for cold intolerance, heat intolerance, polydipsia, polyphagia and polyuria.   Genitourinary:  Positive for difficulty urinating and frequency. Negative for enuresis, erectile dysfunction, genital sores, hematuria and urgency.   Musculoskeletal:  Negative for arthralgias, back pain, gait problem, leg pain, myalgias and neck pain.   Integumentary:  Negative for rash, mole/lesion, breast mass and breast discharge.   Allergic/Immunologic: Negative for environmental allergies and food allergies.   Neurological:  Negative for dizziness, vertigo, tremors, seizures, syncope, facial asymmetry, speech difficulty, weakness, light-headedness, numbness, headaches, coordination difficulties, memory loss and coordination difficulties.   Hematological:  Negative for adenopathy. Does not bruise/bleed easily.   Psychiatric/Behavioral:  Negative for agitation, behavioral problems, confusion, decreased concentration, dysphoric mood, hallucinations, self-injury, sleep disturbance and suicidal ideas. The patient is not nervous/anxious and is not hyperactive.    Breast: Negative for mass    Medical / Social / Family History     Past Medical History:   Diagnosis Date    Kidney stones        Past Surgical History:   Procedure Laterality Date    LITHOTRIPSY         Social History    reports that he has never smoked. He has never been exposed to tobacco smoke. He has never used smokeless tobacco. He reports current alcohol use. He reports that he does not use drugs.    Family History  's family history includes Breast cancer in his mother; No Known Problems in his brother, brother, brother, father, sister, sister, and son.    Medications and Allergies     Medications  Outpatient Medications Marked as Taking for the 1/27/23 encounter (Office Visit) with Mendez Richards, DO   Medication Sig Dispense Refill    [DISCONTINUED] tamsulosin  (FLOMAX) 0.4 mg Cap Take 1 capsule (0.4 mg total) by mouth once daily. 30 capsule 3     Current Facility-Administered Medications for the 1/27/23 encounter (Office Visit) with Mendez Richards DO   Medication Dose Route Frequency Provider Last Rate Last Admin    [COMPLETED] ketorolac injection 60 mg  60 mg Intramuscular 1 time in Clinic/HOD Mendez Richards DO   60 mg at 01/27/23 0910       Allergies  Review of patient's allergies indicates:  No Known Allergies    Physical Examination     Vitals:    01/27/23 0841   BP: 132/80   Pulse: 63   Resp: 18   Temp: 97.9 °F (36.6 °C)     Physical Exam  Constitutional:       General: He is not in acute distress.     Appearance: Normal appearance. He is normal weight. He is not ill-appearing.   Abdominal:      General: Abdomen is flat. Bowel sounds are normal. There is no distension.      Palpations: Abdomen is soft. There is no mass.      Tenderness: There is no abdominal tenderness. There is no right CVA tenderness, left CVA tenderness, guarding or rebound.      Hernia: No hernia is present.   Musculoskeletal:         General: No tenderness. Normal range of motion.   Skin:     General: Skin is warm and dry.      Findings: No rash.   Neurological:      General: No focal deficit present.      Mental Status: He is alert and oriented to person, place, and time.   Psychiatric:         Mood and Affect: Mood normal.         Behavior: Behavior normal.             Lab Results   Component Value Date    WBC 12.25 (H) 08/27/2020    HGB 14.7 08/27/2020    HCT 43.9 08/27/2020    MCV 92 08/27/2020     08/27/2020          Sodium   Date Value Ref Range Status   08/27/2020 138.0 135.0 - 145.0 mmol/L      Potassium   Date Value Ref Range Status   08/27/2020 3.8 3.6 - 5.0 mmol/L      Chloride   Date Value Ref Range Status   08/27/2020 103.0 101.0 - 111.0 mmol/L      CO2   Date Value Ref Range Status   08/27/2020 30 21 - 32 mmol/L      Glucose   Date Value Ref Range Status   08/27/2020  147 (H) 74 - 106 mg/dL      BUN   Date Value Ref Range Status   08/27/2020 25 (H) 7 - 18 mg/dL      Creatinine   Date Value Ref Range Status   08/27/2020 1.1 0.6 - 1.3 mg/dL      Calcium   Date Value Ref Range Status   08/27/2020 8.2 (L) 8.5 - 10.1 mg/dL      Anion Gap   Date Value Ref Range Status   08/27/2020 9       eGFR    Date Value Ref Range Status   08/27/2020 93       Comment:     The above 11 analytes were performed by [id number] [place] [id number]     eGFR   Date Value Ref Range Status   08/27/2020 77        CT Abdomen Pelvis  Without Contrast  Narrative: EXAMINATION:  CT ABDOMEN PELVIS WITHOUT CONTRAST    CLINICAL HISTORY:  Low back pain, unspecifiedFlank pain, kidney stone suspected;    COMPARISON:  CT abdomen pelvis August 27, 2020    TECHNIQUE:  Multiple axial tomographic images of the abdomen and pelvis were obtained without the use of intravenous contrast.    FINDINGS:  Mild dependent changes of the lungs noted.    No worrisome focal hepatic abnormality demonstrated on submitted images.  Gallbladder appears somewhat contracted.  Visualized pancreas appears unremarkable.  Spleen grossly unremarkable.    Several nonobstructing left renal calculi are present.  These are subcentimeter.  One of these is located within the left renal pelvis and measures up to 6.5 mm.  There is no significant hydronephrosis or convincing ureteral calculus.  Bilateral adrenal glands grossly unremarkable.  Urinary bladder incompletely distended.  Prostate and seminal vesicles grossly unremarkable.    No convincing evidence of gastrointestinal obstruction or acute appendicitis.  Small fat containing periumbilical hernia.  Vasculature grossly unremarkable.  Visualized osseous and surrounding soft tissue structures demonstrate no acute abnormality.  Impression: Several nonobstructing left renal calculi are present. These are subcentimeter. One of these is located within the left renal pelvis and  measures up to 6.5 mm.  There is no significant hydronephrosis or convincing ureteral calculus.  Small fat containing periumbilical hernia.  Other/detailed findings as above.    The CT exam was performed using one or more of the following dose    reduction techniques- Automated exposure control, adjustment of the mA    and/or kV according to patient size, and/or use of iterative    reconstructed technique.    Point of Service: [facility]    Electronically signed by: Wilbert Morrison  Date:    08/10/2022  Time:    14:28     Procedures   Assessment and Plan (including Health Maintenance)      Problem List  Smart Sets  Document Outside HM   :    Plan:         Health Maintenance Due   Topic Date Due    Hepatitis C Screening  Never done    Lipid Panel  Never done    HIV Screening  Never done    Colorectal Cancer Screening  Never done    COVID-19 Vaccine (3 - Booster for Moderna series) 05/28/2021    Influenza Vaccine (1) 09/01/2022       Problem List Items Addressed This Visit          Renal/    Kidney stones - Primary    Current Assessment & Plan     Patient with likely kidney stone with right-sided pain.  We will treat him with the Toradol 60 IM today and 10 mg Toradol at home for pain.  Continue Flomax twice daily.  Follow-up on Monday if his pain has not resolved.  No CT scan or x-ray today.  Urinalysis did show blood in his urine         Relevant Medications    ketorolac (TORADOL) 10 mg tablet    tamsulosin (FLOMAX) 0.4 mg Cap    ketorolac injection 60 mg (Completed)    Other Relevant Orders    POCT URINALYSIS W/O SCOPE (Completed)       Orthopedic    Acute left-sided low back pain without sciatica    Relevant Medications    tamsulosin (FLOMAX) 0.4 mg Cap       Health Maintenance Topics with due status: Not Due       Topic Last Completion Date    TETANUS VACCINE 01/03/2023       No future appointments.     Follow up in about 4 days (around 1/31/2023), or if symptoms worsen or fail to improve.      Signature:  Mendez Richards [address] [place]  [address]    Date of encounter: 1/27/23

## 2023-01-27 NOTE — ASSESSMENT & PLAN NOTE
Patient with likely kidney stone with right-sided pain.  We will treat him with the Toradol 60 IM today and 10 mg Toradol at home for pain.  Continue Flomax twice daily.  Follow-up on Monday if his pain has not resolved.  No CT scan or x-ray today.  Urinalysis did show blood in his urine

## 2023-02-08 LAB
CELL MATERIAL STONE EST-MCNT: NORMAL %
SPECIMEN SOURCE: NORMAL

## 2023-04-14 ENCOUNTER — TELEPHONE (OUTPATIENT)
Dept: FAMILY MEDICINE | Facility: CLINIC | Age: 48
End: 2023-04-14
Payer: OTHER GOVERNMENT

## 2023-05-03 ENCOUNTER — OFFICE VISIT (OUTPATIENT)
Dept: FAMILY MEDICINE | Facility: CLINIC | Age: 48
End: 2023-05-03
Payer: OTHER GOVERNMENT

## 2023-05-03 VITALS
DIASTOLIC BLOOD PRESSURE: 80 MMHG | TEMPERATURE: 98 F | SYSTOLIC BLOOD PRESSURE: 118 MMHG | RESPIRATION RATE: 18 BRPM | HEART RATE: 70 BPM | BODY MASS INDEX: 21.66 KG/M2 | OXYGEN SATURATION: 97 % | WEIGHT: 130 LBS | HEIGHT: 65 IN

## 2023-05-03 DIAGNOSIS — R10.9 ACUTE LEFT FLANK PAIN: Primary | ICD-10-CM

## 2023-05-03 PROCEDURE — 99213 PR OFFICE/OUTPT VISIT, EST, LEVL III, 20-29 MIN: ICD-10-PCS | Mod: ,,, | Performed by: NURSE PRACTITIONER

## 2023-05-03 PROCEDURE — 99213 OFFICE O/P EST LOW 20 MIN: CPT | Mod: ,,, | Performed by: NURSE PRACTITIONER

## 2023-05-03 NOTE — ASSESSMENT & PLAN NOTE
Pt states he has Toradol and Flomax at home he can take as needed for stones. We will send him for CT abdomen and pelvis.

## 2023-05-03 NOTE — PROGRESS NOTES
Aixa Hernández NP   [facility]  [address]  [place]  [address]      PATIENT NAME: Brooks Retana  : 1975  DATE: 5/3/23  MRN: [id number]      Billing Provider: Aixa Hernández NP  Level of Service: [place] OFFICE/OUTPT VISIT, EST, LEVL III, 20-29 MIN  Patient PCP Information       Provider PCP Type    Mendez Richards DO General            Reason for Visit / Chief Complaint: Low-back Pain (Pt c/o low back pain. Has a history of kidney stone. )         History of Present Illness / Problem Focused Workflow     Brooks Retana presents to the clinic with Low-back Pain (Pt c/o low back pain. Has a history of kidney stone. )     47 year old male presents to clinic with left flank pain that started a few days ago. He has a history of kidney stones and thinks this may be another stone.       Review of Systems     @Review of Systems   Constitutional:  Negative for activity change, appetite change, fatigue and fever.   HENT:  Negative for nasal congestion, ear pain, rhinorrhea, sinus pressure/congestion and sore throat.    Eyes:  Negative for pain, redness, visual disturbance and eye dryness.   Respiratory:  Negative for cough and shortness of breath.    Cardiovascular:  Negative for chest pain and leg swelling.   Gastrointestinal:  Negative for abdominal distention, abdominal pain, constipation and diarrhea.   Endocrine: Negative for cold intolerance, heat intolerance and polyuria.   Genitourinary:  Negative for bladder incontinence, dysuria, frequency and urgency.   Musculoskeletal:  Positive for back pain. Negative for arthralgias, gait problem and myalgias.   Integumentary:  Negative for color change, rash and wound.   Allergic/Immunologic: Negative for environmental allergies and food allergies.   Neurological:  Negative for dizziness, weakness, light-headedness and headaches.   Psychiatric/Behavioral:  Negative for behavioral problems and sleep disturbance.      Medical / Social / Family  History     Past Medical History:   Diagnosis Date    Kidney stones        Past Surgical History:   Procedure Laterality Date    LITHOTRIPSY         Social History    reports that he has never smoked. He has never been exposed to tobacco smoke. He has never used smokeless tobacco. He reports current alcohol use. He reports that he does not use drugs.    Family History  's family history includes Breast cancer in his mother; No Known Problems in his brother, brother, brother, father, sister, sister, and son.    Medications and Allergies     Medications  No outpatient medications have been marked as taking for the 5/3/23 encounter (Office Visit) with Aixa Hernández NP.       Allergies  Review of patient's allergies indicates:  No Known Allergies    Physical Examination     Vitals:    05/03/23 1340   BP: 118/80   Pulse: 70   Resp: 18   Temp: 97.8 °F (36.6 °C)     Physical Exam  Vitals and nursing note reviewed.   HENT:      Head: Normocephalic.      Right Ear: Tympanic membrane normal.      Left Ear: Tympanic membrane normal.      Nose: Nose normal.      Mouth/Throat:      Mouth: Mucous membranes are moist.      Pharynx: Oropharynx is clear. No posterior oropharyngeal erythema.   Eyes:      Conjunctiva/sclera: Conjunctivae normal.   Cardiovascular:      Rate and Rhythm: Normal rate and regular rhythm.      Pulses: Normal pulses.      Heart sounds: Normal heart sounds.   Pulmonary:      Effort: Pulmonary effort is normal.      Breath sounds: Normal breath sounds.   Abdominal:      General: Abdomen is flat. Bowel sounds are normal. There is no distension.      Palpations: Abdomen is soft.      Tenderness: There is left CVA tenderness.   Musculoskeletal:         General: No swelling or tenderness. Normal range of motion.      Cervical back: Normal range of motion.      Right lower leg: No edema.      Left lower leg: No edema.   Skin:     General: Skin is warm and dry.      Capillary Refill: Capillary refill takes  less than 2 seconds.   Neurological:      Mental Status: He is alert. Mental status is at baseline.   Psychiatric:         Mood and Affect: Mood normal.         Behavior: Behavior normal.             Lab Results   Component Value Date    WBC 12.25 (H) 08/27/2020    HGB 14.7 08/27/2020    HCT 43.9 08/27/2020    MCV 92 08/27/2020     08/27/2020        CMP  Sodium   Date Value Ref Range Status   08/27/2020 138.0 135.0 - 145.0 mmol/L      Potassium   Date Value Ref Range Status   08/27/2020 3.8 3.6 - 5.0 mmol/L      Chloride   Date Value Ref Range Status   08/27/2020 103.0 101.0 - 111.0 mmol/L      CO2   Date Value Ref Range Status   08/27/2020 30 21 - 32 mmol/L      Glucose   Date Value Ref Range Status   08/27/2020 147 (H) 74 - 106 mg/dL      BUN   Date Value Ref Range Status   08/27/2020 25 (H) 7 - 18 mg/dL      Creatinine   Date Value Ref Range Status   08/27/2020 1.1 0.6 - 1.3 mg/dL      Calcium   Date Value Ref Range Status   08/27/2020 8.2 (L) 8.5 - 10.1 mg/dL      Anion Gap   Date Value Ref Range Status   08/27/2020 9       Procedures   Assessment and Plan (including Health Maintenance)   :    Plan:           Problem List Items Addressed This Visit          GI    Acute left flank pain - Primary    Current Assessment & Plan     Pt states he has Toradol and Flomax at home he can take as needed for stones. We will send him for CT abdomen and pelvis.            Relevant Orders    CT Abdomen Pelvis  Without Contrast       Health Maintenance Topics with due status: Not Due       Topic Last Completion Date    Influenza Vaccine 11/06/2021    TETANUS VACCINE 01/03/2023       Future Appointments   Date Time Provider Department Center   5/10/2023  8:30 AM [facility] CT1 [facility] Vinod ESPINOSA        Health Maintenance Due   Topic Date Due    Hepatitis C Screening  Never done    Lipid Panel  Never done    HIV Screening  Never done    Colorectal Cancer Screening  Never done    COVID-19 Vaccine (3 - Booster for  Moderna series) 05/28/2021        No follow-ups on file.     Signature:  Aixa Hernández NP  [address] [place]  [address]    Date of encounter: 5/3/23

## 2023-05-11 ENCOUNTER — HOSPITAL ENCOUNTER (OUTPATIENT)
Dept: RADIOLOGY | Facility: HOSPITAL | Age: 48
Discharge: HOME | End: 2023-05-11
Attending: NURSE PRACTITIONER
Payer: OTHER GOVERNMENT

## 2023-05-11 DIAGNOSIS — R10.9 ACUTE LEFT FLANK PAIN: ICD-10-CM

## 2023-05-11 PROCEDURE — 74176 CT ABD & PELVIS W/O CONTRAST: CPT | Mod: TC

## 2024-08-27 ENCOUNTER — OFFICE VISIT (OUTPATIENT)
Dept: FAMILY MEDICINE | Facility: CLINIC | Age: 49
End: 2024-08-27
Payer: COMMERCIAL

## 2024-08-27 VITALS
WEIGHT: 134.81 LBS | HEIGHT: 65 IN | BODY MASS INDEX: 22.46 KG/M2 | RESPIRATION RATE: 18 BRPM | TEMPERATURE: 99 F | DIASTOLIC BLOOD PRESSURE: 81 MMHG | HEART RATE: 65 BPM | SYSTOLIC BLOOD PRESSURE: 123 MMHG | OXYGEN SATURATION: 97 %

## 2024-08-27 DIAGNOSIS — Z12.5 SCREENING FOR PROSTATE CANCER: ICD-10-CM

## 2024-08-27 DIAGNOSIS — Z13.1 SCREENING FOR DIABETES MELLITUS: ICD-10-CM

## 2024-08-27 DIAGNOSIS — Z12.11 ENCOUNTER FOR COLORECTAL CANCER SCREENING: ICD-10-CM

## 2024-08-27 DIAGNOSIS — Z11.4 SCREENING FOR HIV WITHOUT PRESENCE OF RISK FACTORS: ICD-10-CM

## 2024-08-27 DIAGNOSIS — Z13.220 SCREENING FOR LIPID DISORDERS: ICD-10-CM

## 2024-08-27 DIAGNOSIS — Z12.12 ENCOUNTER FOR COLORECTAL CANCER SCREENING: ICD-10-CM

## 2024-08-27 DIAGNOSIS — Z00.00 ROUTINE GENERAL MEDICAL EXAMINATION AT A HEALTH CARE FACILITY: Primary | ICD-10-CM

## 2024-08-27 DIAGNOSIS — Z11.59 ENCOUNTER FOR HEPATITIS C SCREENING TEST FOR LOW RISK PATIENT: ICD-10-CM

## 2024-08-27 LAB
ALBUMIN SERPL BCP-MCNC: 3.8 G/DL (ref 3.5–5)
ALBUMIN/GLOB SERPL: 1.1 {RATIO}
ALP SERPL-CCNC: 68 U/L (ref 45–115)
ALT SERPL W P-5'-P-CCNC: 21 U/L (ref 16–61)
ANION GAP SERPL CALCULATED.3IONS-SCNC: 12 MMOL/L (ref 7–16)
AST SERPL W P-5'-P-CCNC: 18 U/L (ref 15–37)
BILIRUB SERPL-MCNC: 0.8 MG/DL (ref ?–1.2)
BUN SERPL-MCNC: 17 MG/DL (ref 7–18)
BUN/CREAT SERPL: 20 (ref 6–20)
CALCIUM SERPL-MCNC: 8.8 MG/DL (ref 8.5–10.1)
CHLORIDE SERPL-SCNC: 108 MMOL/L (ref 98–107)
CHOLEST SERPL-MCNC: 177 MG/DL (ref 0–200)
CHOLEST/HDLC SERPL: 2.2 {RATIO}
CO2 SERPL-SCNC: 26 MMOL/L (ref 21–32)
CREAT SERPL-MCNC: 0.86 MG/DL (ref 0.7–1.3)
EGFR (NO RACE VARIABLE) (RUSH/TITUS): 105 ML/MIN/1.73M2
EST. AVERAGE GLUCOSE BLD GHB EST-MCNC: 105 MG/DL
GLOBULIN SER-MCNC: 3.5 G/DL (ref 2–4)
GLUCOSE SERPL-MCNC: 82 MG/DL (ref 74–106)
HBA1C MFR BLD HPLC: 5.3 % (ref 4.5–6.6)
HCV AB SER QL: NORMAL
HDLC SERPL-MCNC: 82 MG/DL (ref 40–60)
HIV 1+O+2 AB SERPL QL: NORMAL
LDLC SERPL CALC-MCNC: 77 MG/DL
NONHDLC SERPL-MCNC: 95 MG/DL
POTASSIUM SERPL-SCNC: 4.5 MMOL/L (ref 3.5–5.1)
PROT SERPL-MCNC: 7.3 G/DL (ref 6.4–8.2)
PSA SERPL-MCNC: 0.91 NG/ML
SODIUM SERPL-SCNC: 141 MMOL/L (ref 136–145)
TRIGL SERPL-MCNC: 91 MG/DL (ref 35–150)
TSH SERPL DL<=0.005 MIU/L-ACNC: 1.57 UIU/ML (ref 0.36–3.74)
VLDLC SERPL-MCNC: 18 MG/DL

## 2024-08-27 PROCEDURE — 3008F BODY MASS INDEX DOCD: CPT | Mod: ,,, | Performed by: NURSE PRACTITIONER

## 2024-08-27 PROCEDURE — 1160F RVW MEDS BY RX/DR IN RCRD: CPT | Mod: ,,, | Performed by: NURSE PRACTITIONER

## 2024-08-27 PROCEDURE — G0103 PSA SCREENING: HCPCS | Mod: ,,, | Performed by: CLINICAL MEDICAL LABORATORY

## 2024-08-27 PROCEDURE — 3074F SYST BP LT 130 MM HG: CPT | Mod: ,,, | Performed by: NURSE PRACTITIONER

## 2024-08-27 PROCEDURE — 3079F DIAST BP 80-89 MM HG: CPT | Mod: ,,, | Performed by: NURSE PRACTITIONER

## 2024-08-27 PROCEDURE — 86803 HEPATITIS C AB TEST: CPT | Mod: ,,, | Performed by: CLINICAL MEDICAL LABORATORY

## 2024-08-27 PROCEDURE — 84443 ASSAY THYROID STIM HORMONE: CPT | Mod: ,,, | Performed by: CLINICAL MEDICAL LABORATORY

## 2024-08-27 PROCEDURE — 1159F MED LIST DOCD IN RCRD: CPT | Mod: ,,, | Performed by: NURSE PRACTITIONER

## 2024-08-27 PROCEDURE — 87389 HIV-1 AG W/HIV-1&-2 AB AG IA: CPT | Mod: ,,, | Performed by: CLINICAL MEDICAL LABORATORY

## 2024-08-27 PROCEDURE — 83036 HEMOGLOBIN GLYCOSYLATED A1C: CPT | Mod: ,,, | Performed by: CLINICAL MEDICAL LABORATORY

## 2024-08-27 PROCEDURE — 85025 COMPLETE CBC W/AUTO DIFF WBC: CPT | Mod: ,,, | Performed by: CLINICAL MEDICAL LABORATORY

## 2024-08-27 PROCEDURE — 99396 PREV VISIT EST AGE 40-64: CPT | Mod: ,,, | Performed by: NURSE PRACTITIONER

## 2024-08-27 PROCEDURE — 80053 COMPREHEN METABOLIC PANEL: CPT | Mod: ,,, | Performed by: CLINICAL MEDICAL LABORATORY

## 2024-08-27 PROCEDURE — 80061 LIPID PANEL: CPT | Mod: ,,, | Performed by: CLINICAL MEDICAL LABORATORY

## 2024-08-27 PROCEDURE — 3044F HG A1C LEVEL LT 7.0%: CPT | Mod: ,,, | Performed by: NURSE PRACTITIONER

## 2024-08-27 NOTE — PROGRESS NOTES
Health Maintenance Due   Topic Date Due    Hepatitis C Screening  Doing today    Lipid Panel  Doing today    HIV Screening  Doing today    Colorectal Cancer Screening  Setting up referral today    COVID-19 Vaccine (3 - 2023-24 season) 09/01/2023    Shingles Vaccine (1 of 2) Patient will check with the [facility] and pharmacy     I have discussed care gaps with patient.

## 2024-08-28 ENCOUNTER — PATIENT MESSAGE (OUTPATIENT)
Dept: FAMILY MEDICINE | Facility: CLINIC | Age: 49
End: 2024-08-28
Payer: COMMERCIAL

## 2024-08-28 ENCOUNTER — PATIENT OUTREACH (OUTPATIENT)
Facility: HOSPITAL | Age: 49
End: 2024-08-28
Payer: COMMERCIAL

## 2024-08-28 LAB
BASOPHILS # BLD AUTO: 0.07 K/UL (ref 0–0.2)
BASOPHILS NFR BLD AUTO: 0.9 % (ref 0–1)
DIFFERENTIAL METHOD BLD: ABNORMAL
EOSINOPHIL # BLD AUTO: 0.12 K/UL (ref 0–0.5)
EOSINOPHIL NFR BLD AUTO: 1.6 % (ref 1–4)
ERYTHROCYTE [DISTWIDTH] IN BLOOD BY AUTOMATED COUNT: 13.3 % (ref 11.5–14.5)
HCT VFR BLD AUTO: 49.7 % (ref 40–54)
HGB BLD-MCNC: 15.6 G/DL (ref 13.5–18)
IMM GRANULOCYTES # BLD AUTO: 0.02 K/UL (ref 0–0.04)
IMM GRANULOCYTES NFR BLD: 0.3 % (ref 0–0.4)
LYMPHOCYTES # BLD AUTO: 2.04 K/UL (ref 1–4.8)
LYMPHOCYTES NFR BLD AUTO: 26.5 % (ref 27–41)
MCH RBC QN AUTO: 30.5 PG (ref 27–31)
MCHC RBC AUTO-ENTMCNC: 31.4 G/DL (ref 32–36)
MCV RBC AUTO: 97.1 FL (ref 80–96)
MONOCYTES # BLD AUTO: 0.69 K/UL (ref 0–0.8)
MONOCYTES NFR BLD AUTO: 9 % (ref 2–6)
MPC BLD CALC-MCNC: 9.6 FL (ref 9.4–12.4)
NEUTROPHILS # BLD AUTO: 4.75 K/UL (ref 1.8–7.7)
NEUTROPHILS NFR BLD AUTO: 61.7 % (ref 53–65)
NRBC # BLD AUTO: 0 X10E3/UL
NRBC, AUTO (.00): 0 %
PLATELET # BLD AUTO: 327 K/UL (ref 150–400)
RBC # BLD AUTO: 5.12 M/UL (ref 4.6–6.2)
WBC # BLD AUTO: 7.69 K/UL (ref 4.5–11)

## 2024-08-28 NOTE — PROGRESS NOTES
Labs reviewed: CMP normal. Cholesterol level looks good. Continue low fat/low cholesterol diet and continue exercise. Hgb A1C normal. HIV, Hep C negative. TSH normal and PSA normal.CBC still pending. Will address when results received.

## 2024-08-28 NOTE — PROGRESS NOTES
Population Health Chart Review & Patient Outreach Details      Further Action Needed If Patient Returns Outreach:            Updates Requested / Reviewed:     []  Care Everywhere    []     []  External Sources ([facility], [facility], [facility], etc.)    [] [facility]   [] [facility]   [] Other:    []  Care Team Updated   []  Removed  or Duplicate Orders   []  Immunization Reconciliation Completed / Queried    [] [place]   [] [place]   [] [place]   [] [place]      Health Maintenance Topics Addressed and Outreach Outcomes / Actions Taken:             Breast Cancer Screening []  Mammogram Order Placed    []  Mammogram Screening Scheduled    []  External Records Requested & Care Team Updated if Applicable    []  External Records Uploaded & Care Team Updated if Applicable    []  Pt Declined Scheduling Mammogram    []  Pt Will Schedule with External Provider / Order Routed & Care Team Updated if Applicable              Cervical Cancer Screening []  Pap Smear Scheduled in Primary Care or OBGYN    []  External Records Requested & Care Team Updated if Applicable       []  External Records Uploaded, Care Team Updated, & History Updated if Applicable    []  Patient Declined Scheduling Pap Smear    []  Patient Will Schedule with External Provider & Care Team Updated if Applicable                  Colorectal Cancer Screening []  Colonoscopy Case Request / Referral / Home Test Order Placed    []  External Records Requested & Care Team Updated if Applicable    []  External Records Uploaded, Care Team Updated, & History Updated if Applicable    []  Patient Declined Completing Colon Cancer Screening    []  Patient Will Schedule with External Provider & Care Team Updated if Applicable    []  Fit Kit Mailed (add the SmartPhrase under additional notes)    []  Reminded Patient to Complete Home Test                Diabetic Eye Exam []  Eye Exam Screening Order Placed    []  Eye Camera Scheduled or Optometry/Ophthalmology Referral  Placed    []  External Records Requested & Care Team Updated if Applicable    []  External Records Uploaded, Care Team Updated, & History Updated if Applicable    []  Patient Declined Scheduling Eye Exam    []  Patient Will Schedule with External Provider & Care Team Updated if Applicable             Blood Pressure Control []  Primary Care Follow Up Visit Scheduled     []  Remote Blood Pressure Reading Captured    []  Patient Declined Remote Reading or Scheduling Appt - Escalated to PCP    []  Patient Will Call Back or Send Portal Message with Reading                 HbA1c & Other Labs []  Overdue Lab(s) Ordered    []  Overdue Lab(s) Scheduled    []  External Records Uploaded & Care Team Updated if Applicable    []  Primary Care Follow Up Visit Scheduled     []  Reminded Patient to Complete A1c Home Test    []  Patient Declined Scheduling Labs or Will Call Back to Schedule    []  Patient Will Schedule with External Provider / Order Routed, & Care Team Updated if Applicable           Primary Care Appointment []  Primary Care Appt Scheduled    []  Patient Declined Scheduling or Will Call Back to Schedule    []  Pt Established with External Provider, Updated Care Team, & Informed Pt to Notify Payor if Applicable           Medication Adherence /    Statin Use []  Primary Care Appointment Scheduled    []  Patient Reminded to  Prescription    []  Patient Declined, Provider Notified if Needed    []  Sent Provider Message to Review to Evaluate Pt for Statin, Add Exclusion Dx Codes, Document   Exclusion in Problem List, Change Statin Intensity Level to Moderate or High Intensity if Applicable                Osteoporosis Screening []  Dexa Order Placed    []  Dexa Appointment Scheduled    []  External Records Requested & Care Team Updated    []  External Records Uploaded, Care Team Updated, & History Updated if Applicable    []  Patient Declined Scheduling Dexa or Will Call Back to Schedule    []  Patient Will Schedule  with External Provider / Order Routed & Care Team Updated if Applicable       Additional Notes:.  Post visit Population Health review of encounter with date of service  8/27/24 with Edgar. Not  All required HY components in encounter. Chart is opened and needs primary dx as z00.00 or z00.01. Also needs CPT for age 49.   Followup appt for: 8/27/25 HY

## 2024-08-28 NOTE — PROGRESS NOTES
CBC reviewed: Normal or clinically insignificant. The abnormal numbers that he sees are as follows: MCV indicates his RBCs are very slightly larger than normal. MCHC is the average amount of hemoglobin in red blood cells. His was very slightly low. However, his RBC and Hgb and Hct were normal so I am not concerned with this. He could start a daily multivitamin with iron or increase iron in his diet. Lymphocytes and monocytes are part of what makes up his white blood cells. They were slightly abnormal but this can be related to any type of viral or bacterial illness or inflammation. His WBC was normal so I am unconcerned with these levels as well.

## 2024-09-04 NOTE — PATIENT INSTRUCTIONS
"Patient Education       Diet and Health   The Basics   Written by the doctors and editors at [facility]   Why is it important to eat a healthy diet? -- It's important to eat a healthy diet because eating the right foods can keep you healthy now and later on in life.  Which foods are especially healthy? -- Foods that are especially healthy include:  Fruits and vegetables - Eating a diet with lots of fruits and vegetables can help prevent heart disease and strokes. It might also help prevent certain types of cancers. Try to eat fruits and vegetables at each meal and also for snacks. If you don't have fresh fruits and vegetables available, you can eat frozen or canned ones instead. Doctors recommend eating at least 2 1/2 servings of vegetables and 2 servings of fruits each day.  Foods with fiber - Eating foods with a lot of fiber can help prevent heart disease and strokes. If you have type 2 diabetes, it can also help control your blood sugar. Foods that have a lot of fiber include vegetables, fruits, beans, nuts, oatmeal, and whole grain breads and cereals. You can tell how much fiber is in a food by reading the nutrition label (figure 1). Doctors recommend eating 25 to 36 grams of fiber each day.  Foods with folate (also called folic acid) - Folate is a vitamin that is important for pregnant people, since it helps prevent certain birth defects. Anyone who could get pregnant should get at least 400 micrograms of folic acid daily, whether or not they are actively trying to get pregnant. Folate is found in many breakfast cereals, oranges, orange juice, and green leafy vegetables.  Foods with calcium and vitamin D - Babies, children, and adults need calcium and vitamin D to help keep their bones strong. Adults also need calcium and vitamin D to help prevent osteoporosis. Osteoporosis is a condition that causes bones to get "thin" and break more easily than usual. Different foods and drinks have calcium and vitamin D in " Pt trying to urinate - uncooperative again w/ staff, grabbing, hit staff 1:1 NST in chest, security called, MD notified.   "them (figure 2). People who don't get enough calcium and vitamin D in their diet might need to take a supplement.  Foods with protein - Protein helps your muscles stay strong. Healthy foods with a lot of protein include chicken, fish, eggs, beans, nuts, and soy products. Red meat also has a lot of protein, but it also contains fats, which can be unhealthy.  Some experts recommend a "Mediterranean diet." This involves eating a lot of fruits, vegetables, nuts, whole grains, and olive oil. It also includes some fish, poultry, and dairy products, but not a lot of red meat. Eating this way can help your overall health, and might even lower your risk of having a stroke.  What foods should I avoid or limit? -- To eat a healthy diet, there are some things you should avoid or limit. They include:   Fats - There are different types of fats. Some types of fats are better for your body than others.  Trans fats are especially unhealthy. They are found in margarines, many fast foods, and some store-bought baked goods. Trans fats can raise your cholesterol level and your increase your chance of getting heart disease. You should avoid eating foods with these types of fats.  The type of "polyunsaturated" fats found in fish seems to be healthy and can reduce your chance of getting heart disease. Other polyunsaturated fats might also be good for your health. When you cook, it's best to use oils with healthier fats, such as olive oil and canola oil.  Sugar - To have a healthy diet, it's important to limit or avoid sugar, sweets, and refined grains. Refined grains are found in white bread, white rice, most forms of pasta, and most packaged "snack" foods. Whole grains, such as whole-wheat bread and brown rice, have more fiber and are better for your health.  Avoiding sugar-sweetened beverages, like soda and sports drinks, can also help improve your health.  Red meat - Studies have shown that eating a lot of red meat can increase your " "risk of certain health problems, including heart disease and cancer. You should limit the amount of red meat that you eat.  Can I drink alcohol as part of a healthy diet? -- People who drink a small amount of alcohol each day might have a lower chance of getting heart disease. But drinking alcohol can lead to problems. For example, it can raise a person's chances of getting liver disease and certain types of cancers. In women, even 1 drink a day can increase the risk of getting breast cancer.  Most doctors recommend that adult women not have more than 1 drink a day and that adult men not have more than 2 drinks a day. The limits are different because most women's bodies take longer to break down alcohol.  How many calories do I need each day? -- The number of calories you need each day depends on your weight, height, age, sex, and how active you are.  Your doctor or nurse can tell you how many calories you should eat each day. If you are trying to lose weight, you should eat fewer calories each day.  What if I have questions? -- If you have questions about which foods you should or should not eat, ask your doctor or nurse. The right diet for you will depend, in part, on your health and any medical conditions you have.  All topics are updated as new evidence becomes available and our peer review process is complete.  This topic retrieved from [employer] on: Sep 21, 2021.  Topic 56028 Version 20.0  Release: 29.4.2 - C29.263  © 2021 UpToDate, Inc. and/or its affiliates. All rights reserved.  figure 1: Nutrition label - fiber     This is an example of a nutrition label. To figure out how much fiber is in a food, look for the line that says "Dietary Fiber." It's also important to look at the serving size. This food has 7 grams of fiber in each serving, and each serving is 1 cup.  Graphic 76512 Version 7.0    figure 2: Foods and drinks with calcium and vitamin D     Foods rich in calcium include ice cream, soy milk, breads, " "kale, broccoli, milk, cheese, cottage cheese, almonds, yogurt, ready-to-eat cereals, beans, and tofu. Foods rich in vitamin D include milk, fortified plant-based "milks" (soy, almond), canned tuna fish, cod liver oil, yogurt, ready-to-eat-cereals, cooked salmon, canned sardines, mackerel, and eggs. Some of these foods are rich in both.  Graphic 53544 Version 4.0    Consumer Information Use and Disclaimer   This information is not specific medical advice and does not replace information you receive from your health care provider. This is only a brief summary of general information. It does NOT include all information about conditions, illnesses, injuries, tests, procedures, treatments, therapies, discharge instructions or life-style choices that may apply to you. You must talk with your health care provider for complete information about your health and treatment options. This information should not be used to decide whether or not to accept your health care provider's advice, instructions or recommendations. Only your health care provider has the knowledge and training to provide advice that is right for you. The use of this information is governed by the [employer] End User License Agreement, available at https://[email or web address]/en/solutions/Delivered/about/candi.The use of [employer] content is governed by the [employer] Terms of Use. ©2021 UpToDate, Inc. All rights reserved.  Copyright   © 2021 UpToDate, Inc. and/or its affiliates. All rights reserved.    "

## 2024-09-04 NOTE — PROGRESS NOTES
Subjective     Patient ID: Brooks Retana is a 49 y.o. male.    Chief Complaint: Annual Exam (Patient is Brooks Retana a 48 y/o male that reports he is here for a Healthy You [facility] annual exam. Patient states he has no issues to report. He states he would just like a full work up. He states he does get vaccines from his [employer] and occasionally blood work.)    49 year old male presents to clinic for a Healthy You visit. Patient states he has no issues to report. He states he would just like a full work up. He states he does get vaccines from his [employer] and occasionally blood work.          Review of Systems   Constitutional:  Negative for activity change and unexpected weight change.   HENT:  Negative for hearing loss, rhinorrhea and trouble swallowing.    Eyes:  Negative for discharge and visual disturbance.   Respiratory:  Negative for chest tightness and wheezing.    Cardiovascular:  Negative for chest pain and palpitations.   Gastrointestinal:  Negative for blood in stool, constipation, diarrhea and vomiting.   Endocrine: Negative for polydipsia and polyuria.   Genitourinary:  Negative for difficulty urinating, hematuria and urgency.   Musculoskeletal:  Negative for arthralgias, joint swelling and neck pain.   Neurological:  Negative for weakness and headaches.   Psychiatric/Behavioral:  Negative for confusion and dysphoric mood.        Tobacco Use: Low Risk  (8/27/2024)    Patient History     Smoking Tobacco Use: Never     Smokeless Tobacco Use: Never     Passive Exposure: Never     Review of patient's allergies indicates:  No Known Allergies  No current outpatient medications  There are no discontinued medications.    Past Medical History:   Diagnosis Date    Kidney stones      Health Maintenance Topics with due status: Not Due       Topic Last Completion Date    TETANUS VACCINE 01/03/2023    Lipid Panel 08/27/2024     Immunization History   Administered Date(s) Administered    COVID-19, MRNA, LN-S,  "PF (MODERNA FULL 0.5 ML DOSE) 03/05/2021, 04/02/2021    Hepatitis A, Adult 02/09/2013, 02/05/2014    Hepatitis B, Adult 02/09/2013, 02/05/2014, 10/01/2014    IPV 02/05/2014    Influenza - Quadrivalent 11/06/2021    Influenza - Trivalent (ADULT) 10/14/2012, 02/05/2014    MMR 02/05/2014, 10/01/2014    Meningococcal Conjugate (MCV4P) 02/05/2014    Tdap 02/09/2013, 01/03/2023       Objective     Body mass index is 22.43 kg/m².  Wt Readings from Last 3 Encounters:   08/27/24 61.1 kg (134 lb 12.8 oz)   05/03/23 59 kg (130 lb)   01/27/23 61.4 kg (135 lb 6.4 oz)     Ht Readings from Last 3 Encounters:   08/27/24 5' 5" (1.651 m)   05/03/23 5' 5" (1.651 m)   01/27/23 5' 5" (1.651 m)     BP Readings from Last 3 Encounters:   08/27/24 123/81   05/03/23 118/80   01/27/23 132/80     Temp Readings from Last 3 Encounters:   08/27/24 98.7 °F (37.1 °C) (Oral)   05/03/23 97.8 °F (36.6 °C) (Oral)   01/27/23 97.9 °F (36.6 °C) (Oral)     Pulse Readings from Last 3 Encounters:   08/27/24 65   05/03/23 70   01/27/23 63     Resp Readings from Last 3 Encounters:   08/27/24 18   05/03/23 18   01/27/23 18     PF Readings from Last 3 Encounters:   No data found for PF       Physical Exam  Vitals and nursing note reviewed.   HENT:      Head: Normocephalic.      Right Ear: Tympanic membrane normal.      Left Ear: Tympanic membrane normal.      Nose: Nose normal.      Mouth/Throat:      Mouth: Mucous membranes are moist.      Pharynx: Oropharynx is clear. No posterior oropharyngeal erythema.   Eyes:      Conjunctiva/sclera: Conjunctivae normal.   Cardiovascular:      Rate and Rhythm: Normal rate and regular rhythm.      Pulses: Normal pulses.      Heart sounds: Normal heart sounds.   Pulmonary:      Effort: Pulmonary effort is normal.      Breath sounds: Normal breath sounds.   Abdominal:      General: Abdomen is flat. Bowel sounds are normal. There is no distension.      Palpations: Abdomen is soft.   Musculoskeletal:         General: No swelling " or tenderness. Normal range of motion.      Cervical back: Normal range of motion.      Right lower leg: No edema.      Left lower leg: No edema.   Skin:     General: Skin is warm and dry.      Capillary Refill: Capillary refill takes less than 2 seconds.   Neurological:      Mental Status: He is alert. Mental status is at baseline.   Psychiatric:         Mood and Affect: Mood normal.         Behavior: Behavior normal.         Assessment and Plan     Problem List Items Addressed This Visit    None  Visit Diagnoses       Routine general medical examination at a health care facility    -  Primary    Relevant Orders    CBC Auto Differential (Completed)    Comprehensive Metabolic Panel (Completed)    TSH (Completed)    Encounter for colorectal cancer screening        Relevant Orders    Ambulatory referral/consult to Gastroenterology    Encounter for hepatitis C screening test for low risk patient        Relevant Orders    Hepatitis C Antibody (Completed)    Screening for HIV without presence of risk factors        Relevant Orders    HIV 1/2 Ag/Ab (4th Gen) (Completed)    Screening for lipid disorders        Relevant Orders    Lipid Panel (Completed)    Screening for prostate cancer        Relevant Orders    PSA, Screening (Completed)    Screening for diabetes mellitus        Relevant Orders    Hemoglobin A1C (Completed)            Plan:   Instructed patient to keep appts as scheduled.   Instructed on DASH diet and increased exercise. Recommended at least 150 minutes a week with resistance training as tolerated. Monitor weight.   Instructed on importance of taking all medications as prescribed.   Discussed yearly dental and eye exams.    Discussed use of sun screen, helmets and seat belts.  Gun safety discussed  Sleep discussed  Stay away from tobacco products    Discussed and provided with a screening schedule and personal prevention plan in accordance with USPSTF age appropriate recommendations and screening  guidelines.   Education given and reviewed with patient. Counseling and referrals were provided as needed.  After Visit Summary printed and given to patient which includes written education and a list of any referrals if indicated.      F/u plan for yearly AWV.        I have reviewed the medications, allergies, and problem list.     Goal Actions:    What type of visit is the patient here for today?: Healthy You  Does the patient consent to enroll in [facility] Healthy?: Yes  Is this a Wellness Follow Up?: No  What is your overall wellness goal? (select at least one): Improve overall health  Choose 3: Lifestyle, Nutrition, Exercise  Lifestyle Actions : Schedule colonoscopy, sigmoidoscopy, or Colorguard if applicable  Nurtrition Actions: Eat a well-balanced diet, drink 8-10 glasses of water per day  Exercise Actions: Recommend physical activity 30 minutes per day 3-5 times/week

## 2024-09-25 ENCOUNTER — TELEPHONE (OUTPATIENT)
Dept: FAMILY MEDICINE | Facility: CLINIC | Age: 49
End: 2024-09-25
Payer: COMMERCIAL

## 2024-09-25 NOTE — TELEPHONE ENCOUNTER
I spoke with patient about a colonoscopy referral. We received letter from [facility]. The letter stated they had attempted to reach patient to schedule screening colonoscopy without success. Patient reports he has tried to contact them to schedule but he left voice mail and has not gotten call back. I gave patient the name of person that sent the letter. He will try to call their office and maybe speak with the person who sent letter.

## 2024-11-15 LAB — CRC RECOMMENDATION EXT: NORMAL

## 2024-11-19 ENCOUNTER — PATIENT OUTREACH (OUTPATIENT)
Facility: HOSPITAL | Age: 49
End: 2024-11-19
Payer: COMMERCIAL

## 2024-11-19 NOTE — LETTER
AUTHORIZATION FOR RELEASE OF   CONFIDENTIAL INFORMATION    Dear GEORGE Rasmussen,    We are seeing Brooks Retana, date of birth 1975, in the clinic at [facility]. Aixa Hernández NP is the patient's PCP. Brooks Retana has an outstanding lab/procedure at the time we reviewed his chart. In order to help keep his health information updated, he has authorized us to request the following medical record(s):        (  )  MAMMOGRAM                                      (X)  COLONOSCOPY      (  )  PAP SMEAR                                          (  )  OUTSIDE LAB RESULTS     (  )  DEXA SCAN                                          (  )  EYE EXAM            (  )  FOOT EXAM                                          (  )  ENTIRE RECORD     (  )  OUTSIDE IMMUNIZATIONS                 (  )  _______________         Please fax records to [facility], Radha Carrillo, [phone].     If you have any questions, please contact [phone].          Patient Name: Brooks Retana  : 1975  Patient Phone #: [phone]

## 2024-11-19 NOTE — PROGRESS NOTES
11/19/2024   --Chart accessed for: Care Gaps  Immunization Database (Immprint/MIXX) reviewed.   Requested colonoscopy records from [facility] Due   Topic Date Due    Colorectal Cancer Screening  Never done    Shingles Vaccine (1 of 2) Never done    Influenza Vaccine (1) 09/01/2024    COVID-19 Vaccine (3 - 2024-25 season) 09/01/2024

## 2024-11-20 ENCOUNTER — PATIENT OUTREACH (OUTPATIENT)
Facility: HOSPITAL | Age: 49
End: 2024-11-20
Payer: COMMERCIAL

## 2025-04-24 ENCOUNTER — OFFICE VISIT (OUTPATIENT)
Dept: FAMILY MEDICINE | Facility: CLINIC | Age: 50
End: 2025-04-24
Payer: COMMERCIAL

## 2025-04-24 ENCOUNTER — RESULTS FOLLOW-UP (OUTPATIENT)
Dept: FAMILY MEDICINE | Facility: CLINIC | Age: 50
End: 2025-04-24

## 2025-04-24 VITALS
WEIGHT: 136.19 LBS | RESPIRATION RATE: 18 BRPM | OXYGEN SATURATION: 98 % | TEMPERATURE: 98 F | SYSTOLIC BLOOD PRESSURE: 120 MMHG | HEIGHT: 65 IN | HEART RATE: 70 BPM | BODY MASS INDEX: 22.69 KG/M2 | DIASTOLIC BLOOD PRESSURE: 81 MMHG

## 2025-04-24 DIAGNOSIS — Z87.442 PERSONAL HISTORY OF KIDNEY STONES: ICD-10-CM

## 2025-04-24 DIAGNOSIS — N20.0 LEFT NEPHROLITHIASIS: Primary | ICD-10-CM

## 2025-04-24 DIAGNOSIS — R31.9 HEMATURIA, UNSPECIFIED TYPE: ICD-10-CM

## 2025-04-24 DIAGNOSIS — R10.9 ACUTE LEFT FLANK PAIN: ICD-10-CM

## 2025-04-24 PROBLEM — Z02.89 HEALTH EXAMINATION OF DEFINED SUBPOPULATION: Status: ACTIVE | Noted: 2025-04-24

## 2025-04-24 PROBLEM — K57.30 DIVERTICULOSIS OF LARGE INTESTINE WITHOUT PERFORATION OR ABSCESS WITHOUT BLEEDING: Status: ACTIVE | Noted: 2024-11-15

## 2025-04-24 PROBLEM — H52.209 ASTIGMATISM: Status: ACTIVE | Noted: 2025-04-24

## 2025-04-24 LAB
BILIRUB SERPL-MCNC: ABNORMAL MG/DL
BILIRUB UR QL STRIP: NEGATIVE
BLOOD URINE, POC: ABNORMAL
CAOX CRY UR QL COMP ASSIST: ABNORMAL
CLARITY UR: CLEAR
CLARITY, UA: ABNORMAL
COLOR UR: YELLOW
COLOR, UA: YELLOW
GLUCOSE UR QL STRIP: NEGATIVE
GLUCOSE UR STRIP-MCNC: NORMAL MG/DL
KETONES UR QL STRIP: NEGATIVE
KETONES UR STRIP-SCNC: NEGATIVE MG/DL
LEUKOCYTE ESTERASE UR QL STRIP: ABNORMAL
LEUKOCYTE ESTERASE URINE, POC: NEGATIVE
MUCOUS, UA: ABNORMAL /LPF
NITRITE UR QL STRIP: NEGATIVE
NITRITE, POC UA: NEGATIVE
PH UR STRIP: 5.5 PH UNITS
PH, POC UA: 5.5
PROT UR QL STRIP: 50
PROTEIN, POC: ABNORMAL
RBC # UR STRIP: ABNORMAL /UL
RBC #/AREA URNS HPF: 20 /HPF
SP GR UR STRIP: 1.03
SPECIFIC GRAVITY, POC UA: >=1.03
SQUAMOUS #/AREA URNS LPF: ABNORMAL /HPF
UROBILINOGEN UR STRIP-ACNC: 2 MG/DL
UROBILINOGEN, POC UA: ABNORMAL
WBC #/AREA URNS HPF: 6 /HPF

## 2025-04-24 PROCEDURE — 99214 OFFICE O/P EST MOD 30 MIN: CPT | Mod: ,,, | Performed by: NURSE PRACTITIONER

## 2025-04-24 PROCEDURE — 3074F SYST BP LT 130 MM HG: CPT | Mod: ,,, | Performed by: NURSE PRACTITIONER

## 2025-04-24 PROCEDURE — 81001 URINALYSIS AUTO W/SCOPE: CPT | Mod: ,,, | Performed by: CLINICAL MEDICAL LABORATORY

## 2025-04-24 PROCEDURE — 3079F DIAST BP 80-89 MM HG: CPT | Mod: ,,, | Performed by: NURSE PRACTITIONER

## 2025-04-24 PROCEDURE — 3008F BODY MASS INDEX DOCD: CPT | Mod: ,,, | Performed by: NURSE PRACTITIONER

## 2025-04-24 PROCEDURE — 1159F MED LIST DOCD IN RCRD: CPT | Mod: ,,, | Performed by: NURSE PRACTITIONER

## 2025-04-24 PROCEDURE — 1160F RVW MEDS BY RX/DR IN RCRD: CPT | Mod: ,,, | Performed by: NURSE PRACTITIONER

## 2025-04-24 RX ORDER — KETOROLAC TROMETHAMINE 10 MG/1
10 TABLET, FILM COATED ORAL EVERY 6 HOURS
Qty: 20 TABLET | Refills: 0 | Status: SHIPPED | OUTPATIENT
Start: 2025-04-24 | End: 2025-04-29

## 2025-04-24 RX ORDER — TAMSULOSIN HYDROCHLORIDE 0.4 MG/1
0.4 CAPSULE ORAL DAILY
Qty: 30 CAPSULE | Refills: 0 | Status: SHIPPED | OUTPATIENT
Start: 2025-04-24

## 2025-04-24 NOTE — PROGRESS NOTES
[facility]    Tabitha Arora AGPCNP-BC [facility] [id number] [place] [address]  [phone]          PATIENT NAME: Brooks Retana  : 1975  DATE: 25  MRN: [id number]          Reason for Visit        Chief Complaint   Patient presents with    Nephrolithiasis     Pt presents to clinic with what he believes is kidney stones. He has had kidney stones multiple times. Pt has noticed being uncomfortable for a couple of weeks now but he states it has not been unbearable. Pt states he usually just deals with it with toradol and flomax but he is out of the toradol now and he took a flomax this morning         History of Present Illness      History of Present Illness               MEDICAL / SURGICAL / SOCIAL HISTORY     Past Medical History:   Diagnosis Date    Kidney stones        Past Surgical History:   Procedure Laterality Date    LITHOTRIPSY         Social History[1]      I personally reviewed all past medical, surgical, and social.     MEDICATIONS / ALLERGIES / HM     Current Medications[2]    Review of patient's allergies indicates:  No Known Allergies    Immunization History   Administered Date(s) Administered    COVID-19, MRNA, LN-S, PF (MODERNA FULL 0.5 ML DOSE) 2021, 2021    Hepatitis A, Adult 2013, 2014    Hepatitis B, Adult 2013, 2014, 10/01/2014    IPV 2014    Influenza - Quadrivalent 2021    Influenza - Trivalent - Afluria, Fluzone MDV 10/14/2012, 2014    MMR 2014, 10/01/2014    Meningococcal Conjugate (MCV4P) 2014    PPD Test 11/10/2014    Tdap 2013, 2023        Health Maintenance   Topic Date Due    Shingles Vaccine (1 of 2) Never done    Pneumococcal Vaccines (Age 50+) (1 of 1 - PCV) Never done    Influenza Vaccine (1) 2024    COVID-19 Vaccine (3 -  season) 2024    Lipid Panel  2029    TETANUS VACCINE  " 01/03/2033    Colorectal Cancer Screening  11/15/2034    RSV Vaccine (Age 60+ and Pregnant patients) (1 - 1-dose 75+ series) 07/12/2049    Hepatitis C Screening  Completed    HIV Screening  Completed        Physical Exam      Vital Signs  Temp: 98.1 °F (36.7 °C)  Temp Source: Oral  Pulse: 70  Resp: 18  SpO2: 98 %  BP: 120/81  Pain Score:   5  Pain Loc: Back  Height and Weight  Height: 5' 5" (165.1 cm)  Weight: 61.8 kg (136 lb 3.2 oz)  BSA (Calculated - sq m): 1.68 sq meters  BMI (Calculated): 22.7  Weight in (lb) to have BMI = 25: 149.9]    Physical Exam       Laboratory:    Lab Results   Component Value Date    GLU 82 08/27/2024     08/27/2024    K 4.5 08/27/2024     (H) 08/27/2024    CO2 26 08/27/2024    BUN 17 08/27/2024    CREATININE 0.86 08/27/2024    CALCIUM 8.8 08/27/2024    PROT 7.3 08/27/2024    ALBUMIN 3.8 08/27/2024    BILITOT 0.8 08/27/2024    ALKPHOS 68 08/27/2024    AST 18 08/27/2024    ALT 21 08/27/2024    ANIONGAP 12 08/27/2024    ESTGFRAFRICA 93 08/27/2020    EGFRNONAA 77 08/27/2020       Lab Results   Component Value Date    WBC 7.69 08/27/2024    RBC 5.12 08/27/2024    HGB 15.6 08/27/2024    HCT 49.7 08/27/2024    MCV 97.1 (H) 08/27/2024    RDW 13.3 08/27/2024     08/27/2024        Lab Results   Component Value Date    CHOL 177 08/27/2024    TRIG 91 08/27/2024    HDL 82 (H) 08/27/2024    LDLCALC 77 08/27/2024       Lab Results   Component Value Date    TSH 1.570 08/27/2024       Lab Results   Component Value Date    HGBA1C 5.3 08/27/2024    ESTIMATEDAVG 105 08/27/2024        No results found for: "[id number]"    No results found for: "[id number]"    Lab Results   Component Value Date    PSA 0.909 08/27/2024       Point Of Care Testing:    WBC, UA   Date Value Ref Range Status   04/24/2025 negative  Final     Nitrite, UA   Date Value Ref Range Status   04/24/2025 negative  Final     Urobilinogen, UA   Date Value Ref Range Status   04/24/2025 0.2e.u./dl  Final     Protein, POC " "  Date Value Ref Range Status   04/24/2025 100mg/dl (A)  Final     pH, UA   Date Value Ref Range Status   04/24/2025 5.5  Final     Spec Grav UA   Date Value Ref Range Status   04/24/2025 >=1.030 (A)  Final     Ketones, UA   Date Value Ref Range Status   04/24/2025 negative  Final     Bilirubin, POC   Date Value Ref Range Status   04/24/2025 small (A)  Final     Glucose, UA   Date Value Ref Range Status   04/24/2025 negative  Final       No results found for: "[id number]", "FLUAMOLEC", "FLUBMOLEC", "MOLSTREPAPOC"    Painful micturition, unspecified     TECHNIQUE:  KUB, 2 supine views     COMPARISON:  04/05/2021 and CT scan of the abdomen and pelvis dated 05/11/2023     FINDINGS:  Abundant stool is seen throughout the colon but there is no evidence of a fecal impaction.  There are calcifications which overlie the lower pole the left kidney in this patient with previously documented nephrolithiasis.  There are 2 calcifications in the left lower quadrant at or above the level of the ischial spine.  Bony structures are within normal limits.     Impression:     1.  Abundant stool but nonobstructive bowel gas pattern.     2.  There is left nephrolithiasis.  If there is any concern for distal ureterolithiasis, additional imaging would be needed.     Place of service: [facility]        Electronically signed by:Cinthya Chawla  Date:                                            04/24/2025  Time:                                           12:58      Assessment/Plan     Left nephrolithiasis  -     tamsulosin (FLOMAX) 0.4 mg Cap; Take 1 capsule (0.4 mg total) by mouth once daily.  Dispense: 30 capsule; Refill: 0  -     ketorolac (TORADOL) 10 mg tablet; Take 1 tablet (10 mg total) by mouth every 6 (six) hours. for 5 days  Dispense: 20 tablet; Refill: 0    Acute left flank pain  -     POCT URINALYSIS W/O SCOPE  -     X-Ray KUB  -     Urinalysis, Reflex to Urine Culture; Future; Expected date: 04/24/2025  -     " tamsulosin (FLOMAX) 0.4 mg Cap; Take 1 capsule (0.4 mg total) by mouth once daily.  Dispense: 30 capsule; Refill: 0  -     ketorolac (TORADOL) 10 mg tablet; Take 1 tablet (10 mg total) by mouth every 6 (six) hours. for 5 days  Dispense: 20 tablet; Refill: 0    Hematuria, unspecified type  -     X-Ray KUB  -     Urinalysis, Reflex to Urine Culture; Future; Expected date: 04/24/2025    Personal history of kidney stones  Comments:  CT 5/11/23 6 mm nonobstructing left calculus CT 8/10/22 Several left calculi, largest 6.5 mm left pelvis. 4/5/21 8mm calculus overlying the lower left kidney  Orders:  -     X-Ray KUB; Future; Expected date: 04/24/2025          Future Appointments   Date Time Provider Department Center   8/27/2025  8:00 AM Aixa Hernández NP [facility]       Workup results were reviewed and all questions were answered. Diagnosis and treatment options were discussed and the patient  is amenable with the overall treatment plan. Verbal and written discharge instructions were given including to return to clinic/ED with any acute worsening of symptoms or failure of symptoms to improve. The reasons for return to the clinic/ED were explained in lay terms. No further intervention is warranted at this time. The patient agrees with the plan, expresses understanding, is hemodynamically stable and in no acute distress.     All questions answered to desired level of satisfaction    This note was generated with the assistance of ambient listening technology. Verbal consent was obtained by the patient and accompanying visitor(s) for the recording of patient appointment to facilitate this note. I attest to having reviewed and edited the generated note for accuracy, though some syntax or spelling errors may persist. Please contact the author of this note for any clarification.             GIOVANNI Lance-BC [facility]              [1]   Social History  Tobacco Use    Smoking  status: Never     Passive exposure: Never    Smokeless tobacco: Never   Substance Use Topics    Alcohol use: Yes     Comment: very occasionally    Drug use: Never   [2]   Current Outpatient Medications   Medication Sig Dispense Refill    ketorolac (TORADOL) 10 mg tablet Take 1 tablet (10 mg total) by mouth every 6 (six) hours. for 5 days 20 tablet 0    tamsulosin (FLOMAX) 0.4 mg Cap Take 1 capsule (0.4 mg total) by mouth once daily. 30 capsule 0     No current facility-administered medications for this visit.

## 2025-04-25 NOTE — PROGRESS NOTES
[facility]    Tabitha Arora AGPCNP-BC [facility] [address] [place] [address]  [phone]          PATIENT NAME: Brooks Retana  : 1975  DATE: 25  MRN: [id number]          Reason for Visit        Chief Complaint   Patient presents with    Nephrolithiasis     Pt presents to clinic with what he believes is kidney stones. He has had kidney stones multiple times. Pt has noticed being uncomfortable for a couple of weeks now but he states it has not been unbearable. Pt states he usually just deals with it with toradol and flomax but he is out of the toradol now and he took a flomax this morning         History of Present Illness      CHIEF COMPLAINT:  Patient presents today with lower left back pain concerning for kidney stones.    HISTORY OF PRESENT ILLNESS:  He reports onset of symptoms approximately two weeks ago after a prolonged asymptomatic period. He describes awareness of discomfort in the affected area but denies severe pain. He reports some urinary frequency, though not significant, and denies urgency, pain, or burning during urination.    KIDNEY STONE HISTORY:  He has a history of recurrent kidney stones with 7-8 episodes over the past 7 years, occurring approximately annually since age [age]. Previous stone analysis revealed calcium oxalate composition. Stones predominantly affect the left side, with occasional smaller stones on the right.     CURRENT MEDICATIONS:  He reports having  6-8 Flomax pills remaining from a previous prescription. He reports taking Tylenol PM along with this last night and was able to go to sleep.    ROS:  General: -fever, -chills, -fatigue, -weight gain, -weight loss  Eyes: -vision changes, -redness, -discharge  ENT: -ear pain, -nasal congestion, -sore throat  Cardiovascular: -chest pain, -palpitations, -lower extremity edema  Respiratory: -cough, -shortness of  breath  Gastrointestinal: -abdominal pain, -nausea, -vomiting, -diarrhea, -constipation, -blood in stool  Genitourinary: -dysuria, +hematuria, +frequency  Musculoskeletal: -joint pain, -muscle pain, +back pain  Skin: -rash, -lesion  Neurological: -headache, -dizziness, -numbness, -tingling  Psychiatric: -anxiety, -depression, -sleep difficulty          MEDICAL / SURGICAL / SOCIAL HISTORY     Past Medical History:   Diagnosis Date    Kidney stones        Past Surgical History:   Procedure Laterality Date    LITHOTRIPSY         Social History     Tobacco Use    Smoking status: Never     Passive exposure: Never    Smokeless tobacco: Never   Substance Use Topics    Alcohol use: Yes     Comment: very occasionally    Drug use: Never     I personally reviewed all past medical, surgical, and social.     MEDICATIONS / ALLERGIES / HM     Current Outpatient Medications   Medication Sig Dispense Refill    ketorolac (TORADOL) 10 mg tablet Take 1 tablet (10 mg total) by mouth every 6 (six) hours. for 5 days 20 tablet 0    tamsulosin (FLOMAX) 0.4 mg Cap Take 1 capsule (0.4 mg total) by mouth once daily. 30 capsule 0     No current facility-administered medications for this visit.     Review of patient's allergies indicates:  No Known Allergies    Immunization History   Administered Date(s) Administered    COVID-19, MRNA, LN-S, PF (MODERNA FULL 0.5 ML DOSE) 03/05/2021, 04/02/2021    Hepatitis A, Adult 02/09/2013, 02/05/2014    Hepatitis B, Adult 02/09/2013, 02/05/2014, 10/01/2014    IPV 02/05/2014    Influenza - Quadrivalent 11/06/2021    Influenza - Trivalent - Afluria, Fluzone MDV 10/14/2012, 02/05/2014    MMR 02/05/2014, 10/01/2014    Meningococcal Conjugate (MCV4P) 02/05/2014    PPD Test 11/10/2014    Tdap 02/09/2013, 01/03/2023        Health Maintenance   Topic Date Due    Shingles Vaccine (1 of 2) Never done    Pneumococcal Vaccines (Age 50+) (1 of 1 - PCV) Never done    Influenza Vaccine (1) 09/01/2024    COVID-19 Vaccine (3  "- 2024-25 season) 09/01/2024    Lipid Panel  08/27/2029    TETANUS VACCINE  01/03/2033    Colorectal Cancer Screening  11/15/2034    RSV Vaccine (Age 60+ and Pregnant patients) (1 - 1-dose 75+ series) 07/12/2049    Hepatitis C Screening  Completed    HIV Screening  Completed        Physical Exam      Vital Signs  Temp: 98.1 °F (36.7 °C)  Temp Source: Oral  Pulse: 70  Resp: 18  SpO2: 98 %  BP: 120/81  Pain Score:   5  Pain Loc: Back  Height and Weight  Height: 5' 5" (165.1 cm)  Weight: 61.8 kg (136 lb 3.2 oz)  BSA (Calculated - sq m): 1.68 sq meters  BMI (Calculated): 22.7  Weight in (lb) to have BMI = 25: 149.9]    Physical Exam  Constitutional:       General: He is not in acute distress.     Appearance: He is well-developed, well-groomed and normal weight.   HENT:      Head: Normocephalic and atraumatic.      Right Ear: External ear normal.      Left Ear: External ear normal.   Cardiovascular:      Rate and Rhythm: Normal rate and regular rhythm.      Pulses: Normal pulses.      Heart sounds: Normal heart sounds.   Pulmonary:      Effort: Pulmonary effort is normal.      Breath sounds: Normal breath sounds.   Abdominal:      Palpations: Abdomen is soft.      Tenderness: There is no abdominal tenderness. There is no right CVA tenderness or left CVA tenderness.   Skin:     General: Skin is warm and dry.   Neurological:      Mental Status: He is alert and oriented to person, place, and time.   Psychiatric:         Mood and Affect: Mood normal.         Behavior: Behavior normal. Behavior is cooperative.      Laboratory:    Lab Results   Component Value Date    GLU 82 08/27/2024     08/27/2024    K 4.5 08/27/2024     (H) 08/27/2024    CO2 26 08/27/2024    BUN 17 08/27/2024    CREATININE 0.86 08/27/2024    CALCIUM 8.8 08/27/2024    PROT 7.3 08/27/2024    ALBUMIN 3.8 08/27/2024    BILITOT 0.8 08/27/2024    ALKPHOS 68 08/27/2024    AST 18 08/27/2024    ALT 21 08/27/2024    ANIONGAP 12 08/27/2024    ESTGFRAFRICA " "93 08/27/2020    EGFRNONAA 77 08/27/2020       Lab Results   Component Value Date    WBC 7.69 08/27/2024    RBC 5.12 08/27/2024    HGB 15.6 08/27/2024    HCT 49.7 08/27/2024    MCV 97.1 (H) 08/27/2024    RDW 13.3 08/27/2024     08/27/2024        Lab Results   Component Value Date    CHOL 177 08/27/2024    TRIG 91 08/27/2024    HDL 82 (H) 08/27/2024    LDLCALC 77 08/27/2024       Lab Results   Component Value Date    TSH 1.570 08/27/2024       Lab Results   Component Value Date    HGBA1C 5.3 08/27/2024    ESTIMATEDAVG 105 08/27/2024      No results found for: "EOBTQCBQ82"    No results found for: "GSVFCKQR80YW"    Lab Results   Component Value Date    PSA 0.909 08/27/2024       Point Of Care Testing:    WBC, UA   Date Value Ref Range Status   04/24/2025 negative  Final     Nitrites, UA   Date Value Ref Range Status   04/24/2025 Negative Negative Final     Urobilinogen, UA   Date Value Ref Range Status   04/24/2025 2 (A) 0.2, 1.0, Normal mg/dL Final     Protein, POC   Date Value Ref Range Status   04/24/2025 100mg/dl (A)  Final     pH, UA   Date Value Ref Range Status   04/24/2025 5.5 5.0 to 8.0 pH Units Final     Specific Gravity, UA   Date Value Ref Range Status   04/24/2025 1.033 (H) <=1.030 Final     Ketones, UA   Date Value Ref Range Status   04/24/2025 Negative Negative mg/dL Final     Bilirubin, POC   Date Value Ref Range Status   04/24/2025 small (A)  Final     Glucose, UA   Date Value Ref Range Status   04/24/2025 negative  Final       No results found for: "[id number]", "FLUAMOLEC", "FLUBMOLEC", "MOLSTREPAPOC"    Assessment/Plan     Left nephrolithiasis  -     tamsulosin (FLOMAX) 0.4 mg Cap; Take 1 capsule (0.4 mg total) by mouth once daily.  Dispense: 30 capsule; Refill: 0  -     ketorolac (TORADOL) 10 mg tablet; Take 1 tablet (10 mg total) by mouth every 6 (six) hours. for 5 days  Dispense: 20 tablet; Refill: 0    Acute left flank pain  -     POCT URINALYSIS W/O SCOPE; yellow, cloudy + protein, " small bilirubin, moderate blood, - nitrite  -     X-Ray KUB; revealed left nephrolithiasis.   -     Urinalysis, Reflex to Urine Culture; Future; Expected date: 04/24/2025  -     tamsulosin (FLOMAX) 0.4 mg Cap; Take 1 capsule (0.4 mg total) by mouth once daily.  Dispense: 30 capsule; Refill: 0  -     ketorolac (TORADOL) 10 mg tablet; Take 1 tablet (10 mg total) by mouth every 6 (six) hours. for 5 days  Dispense: 20 tablet; Refill: 0  -     Urinalysis, Microscopic    Hematuria, unspecified type  -     X-Ray KUB; left nephrolithiasis.   -     Urinalysis, Reflex to Urine Culture; Future; Expected date: 04/24/2025  -     Urinalysis, Microscopic    Personal history of kidney stones  Comments:  CT 5/11/23 6 mm nonobstructing left calculus CT 8/10/22 Several left calculi, largest 6.5 mm left pelvis. 4/5/21 8mm calculus overlying the lower left kidney  Orders:  -     X-Ray KUB; left nephrolithiasis      Assessment & Plan    IMPRESSION:  - Suspect recurrent kidney stone based on history and current symptoms.  - Reviewed previous CT Abdomen reports.  - Noted consistent pattern of left-sided stones in history.  - Urinalysis results showed presence of blood but absence of bacteria.    KIDNEY STONES:  - Evaluated the patient's history of kidney stones, occurring approximately once a year for the past 7 years.  - Noted current lower left back pain, which started about 2 weeks ago.  - Reviewed previous CT from May 11th, 2023, which showed a 6mm stone on the left side.  - Noted presence of blood but no bacteria in urine test.  - Ordered KUB X-ray to evaluate for kidney stones.  - Clarified that KUB X-ray shows kidneys, ureters, and bladder, and may detect stones.  - Informed the patient that radiologist may recommend CT if stone is suspected on X-ray.  - Provided urine strainer for potential stone collection.  - Prescribed Flomax to facilitate stone passage.  - Prescribed oral Toradol for pain management, with instructions for  limited use due to potential kidney effects.  - Documented patient's history of kidney stones, occurring about once a year for the past 7 years, starting at age 41 or 42.  - Noted previous stone analysis showed calcium oxalate composition.    FOLLOW-UP:  - Follow up with PCP if symptoms persist, worsen, or new symptoms develop.  - Ordered microscopic urinalysis with reflex urine culture to further investigate, will notify with results when available.          Future Appointments   Date Time Provider Department Center   8/27/2025  8:00 AM Aixa Hernández NP [facility]       Workup results were reviewed and all questions were answered. Diagnosis and treatment options were discussed and the patient  is amenable with the overall treatment plan. Verbal and written discharge instructions were given including to return to clinic/ED with any acute worsening of symptoms or failure of symptoms to improve. The reasons for return to the clinic/ED were explained in lay terms. No further intervention is warranted at this time. The patient agrees with the plan, expresses understanding, is hemodynamically stable and in no acute distress.     All questions answered to desired level of satisfaction    This note was generated with the assistance of ambient listening technology. Verbal consent was obtained by the patient and accompanying visitor(s) for the recording of patient appointment to facilitate this note. I attest to having reviewed and edited the generated note for accuracy, though some syntax or spelling errors may persist. Please contact the author of this note for any clarification.       GIOVANNI Lance-BC [facility]

## 2025-04-25 NOTE — PROGRESS NOTES
[facility]    Tabitha Arora AGPCNP-BC [facility] [address] [place] [address]  [phone]          PATIENT NAME: Brooks Retana  : 1975  DATE: 25  MRN: [id number]          Reason for Visit        Chief Complaint   Patient presents with    Nephrolithiasis     Pt presents to clinic with what he believes is kidney stones. He has had kidney stones multiple times. Pt has noticed being uncomfortable for a couple of weeks now but he states it has not been unbearable. Pt states he usually just deals with it with toradol and flomax but he is out of the toradol now and he took a flomax this morning         History of Present Illness      History of Present Illness    CHIEF COMPLAINT:  Patient presents today with lower left back pain concerning for kidney stones.    HISTORY OF PRESENT ILLNESS:  He reports onset of symptoms approximately two weeks ago after a prolonged asymptomatic period. He describes awareness of discomfort in the affected area but denies severe pain. He reports some urinary frequency, though not significant, and denies urgency, pain, or burning during urination.    KIDNEY STONE HISTORY:  He has a history of recurrent kidney stones with 7-8 episodes over the past 7 years, occurring approximately annually since age [age]. Previous stone analysis revealed calcium oxalate composition. Stones predominantly affect the left side, with occasional smaller stones on the right. CT in May 2023 identified a 6mm left-sided stone. In , he required lithotripsy for an 8mm stone. He reports two previous ER visits for severe symptoms.    CURRENT MEDICATIONS:  He has 6-8 Flomax pills remaining from a previous prescription. He typically manages kidney stones with Toradol and Flomax.      ROS:  General: -fever, -chills, -fatigue, -weight gain, -weight loss  Eyes: -vision changes, -redness, -discharge  ENT: -ear pain,  -nasal congestion, -sore throat  Cardiovascular: -chest pain, -palpitations, -lower extremity edema  Respiratory: -cough, -shortness of breath  Gastrointestinal: -abdominal pain, -nausea, -vomiting, -diarrhea, -constipation, -blood in stool  Genitourinary: -dysuria, +hematuria, +frequency  Musculoskeletal: -joint pain, -muscle pain, +back pain  Skin: -rash, -lesion  Neurological: -headache, -dizziness, -numbness, -tingling  Psychiatric: -anxiety, -depression, -sleep difficulty          MEDICAL / SURGICAL / SOCIAL HISTORY     Past Medical History:   Diagnosis Date    Kidney stones        Past Surgical History:   Procedure Laterality Date    LITHOTRIPSY         Social History[1]      I personally reviewed all past medical, surgical, and social.     MEDICATIONS / ALLERGIES / HM     Current Medications[2]    Review of patient's allergies indicates:  No Known Allergies    Immunization History   Administered Date(s) Administered    COVID-19, MRNA, LN-S, PF (MODERNA FULL 0.5 ML DOSE) 03/05/2021, 04/02/2021    Hepatitis A, Adult 02/09/2013, 02/05/2014    Hepatitis B, Adult 02/09/2013, 02/05/2014, 10/01/2014    IPV 02/05/2014    Influenza - Quadrivalent 11/06/2021    Influenza - Trivalent - Afluria, Fluzone MDV 10/14/2012, 02/05/2014    MMR 02/05/2014, 10/01/2014    Meningococcal Conjugate (MCV4P) 02/05/2014    PPD Test 11/10/2014    Tdap 02/09/2013, 01/03/2023        Health Maintenance   Topic Date Due    Shingles Vaccine (1 of 2) Never done    Pneumococcal Vaccines (Age 50+) (1 of 1 - PCV) Never done    Influenza Vaccine (1) 09/01/2024    COVID-19 Vaccine (3 - 2024-25 season) 09/01/2024    Lipid Panel  08/27/2029    TETANUS VACCINE  01/03/2033    Colorectal Cancer Screening  11/15/2034    RSV Vaccine (Age 60+ and Pregnant patients) (1 - 1-dose 75+ series) 07/12/2049    Hepatitis C Screening  Completed    HIV Screening  Completed        Physical Exam      Vital Signs  Temp: 98.1 °F (36.7 °C)  Temp Source: Oral  Pulse:  "70  Resp: 18  SpO2: 98 %  BP: 120/81  Pain Score:   5  Pain Loc: Back  Height and Weight  Height: 5' 5" (165.1 cm)  Weight: 61.8 kg (136 lb 3.2 oz)  BSA (Calculated - sq m): 1.68 sq meters  BMI (Calculated): 22.7  Weight in (lb) to have BMI = 25: 149.9]    Physical Exam       Laboratory:    Lab Results   Component Value Date    GLU 82 08/27/2024     08/27/2024    K 4.5 08/27/2024     (H) 08/27/2024    CO2 26 08/27/2024    BUN 17 08/27/2024    CREATININE 0.86 08/27/2024    CALCIUM 8.8 08/27/2024    PROT 7.3 08/27/2024    ALBUMIN 3.8 08/27/2024    BILITOT 0.8 08/27/2024    ALKPHOS 68 08/27/2024    AST 18 08/27/2024    ALT 21 08/27/2024    ANIONGAP 12 08/27/2024    ESTGFRAFRICA 93 08/27/2020    EGFRNONAA 77 08/27/2020       Lab Results   Component Value Date    WBC 7.69 08/27/2024    RBC 5.12 08/27/2024    HGB 15.6 08/27/2024    HCT 49.7 08/27/2024    MCV 97.1 (H) 08/27/2024    RDW 13.3 08/27/2024     08/27/2024        Lab Results   Component Value Date    CHOL 177 08/27/2024    TRIG 91 08/27/2024    HDL 82 (H) 08/27/2024    LDLCALC 77 08/27/2024       Lab Results   Component Value Date    TSH 1.570 08/27/2024       Lab Results   Component Value Date    HGBA1C 5.3 08/27/2024    ESTIMATEDAVG 105 08/27/2024        No results found for: "CNRSFHJZ36"    No results found for: "[id number]"    Lab Results   Component Value Date    PSA 0.909 08/27/2024       Point Of Care Testing:    WBC, UA   Date Value Ref Range Status   04/24/2025 negative  Final     Nitrites, UA   Date Value Ref Range Status   04/24/2025 Negative Negative Final     Urobilinogen, UA   Date Value Ref Range Status   04/24/2025 2 (A) 0.2, 1.0, Normal mg/dL Final     Protein, POC   Date Value Ref Range Status   04/24/2025 100mg/dl (A)  Final     pH, UA   Date Value Ref Range Status   04/24/2025 5.5 5.0 to 8.0 pH Units Final     Specific Gravity, UA   Date Value Ref Range Status   04/24/2025 1.033 (H) <=1.030 Final     Ketones, UA   Date " "Value Ref Range Status   04/24/2025 Negative Negative mg/dL Final     Bilirubin, POC   Date Value Ref Range Status   04/24/2025 small (A)  Final     Glucose, UA   Date Value Ref Range Status   04/24/2025 negative  Final       No results found for: "[id number]", "FLUAMOLEC", "FLUBMOLEC", "MOLSTREPAPOC"    Painful micturition, unspecified     TECHNIQUE:  KUB, 2 supine views     COMPARISON:  04/05/2021 and CT scan of the abdomen and pelvis dated 05/11/2023     FINDINGS:  Abundant stool is seen throughout the colon but there is no evidence of a fecal impaction.  There are calcifications which overlie the lower pole the left kidney in this patient with previously documented nephrolithiasis.  There are 2 calcifications in the left lower quadrant at or above the level of the ischial spine.  Bony structures are within normal limits.     Impression:     1.  Abundant stool but nonobstructive bowel gas pattern.     2.  There is left nephrolithiasis.  If there is any concern for distal ureterolithiasis, additional imaging would be needed.     Place of service: [facility]        Electronically signed by:Cinthya Chawla  Date:                                            04/24/2025  Time:                                           12:58      Assessment/Plan     Left nephrolithiasis  -     tamsulosin (FLOMAX) 0.4 mg Cap; Take 1 capsule (0.4 mg total) by mouth once daily.  Dispense: 30 capsule; Refill: 0  -     ketorolac (TORADOL) 10 mg tablet; Take 1 tablet (10 mg total) by mouth every 6 (six) hours. for 5 days  Dispense: 20 tablet; Refill: 0    Acute left flank pain  -     POCT URINALYSIS W/O SCOPE  -     X-Ray KUB  -     Urinalysis, Reflex to Urine Culture; Future; Expected date: 04/24/2025  -     tamsulosin (FLOMAX) 0.4 mg Cap; Take 1 capsule (0.4 mg total) by mouth once daily.  Dispense: 30 capsule; Refill: 0  -     ketorolac (TORADOL) 10 mg tablet; Take 1 tablet (10 mg total) by mouth every 6 (six) hours. for 5 days "  Dispense: 20 tablet; Refill: 0    Hematuria, unspecified type  -     X-Ray KUB  -     Urinalysis, Reflex to Urine Culture; Future; Expected date: 04/24/2025    Personal history of kidney stones  Comments:  CT 5/11/23 6 mm nonobstructing left calculus CT 8/10/22 Several left calculi, largest 6.5 mm left pelvis. 4/5/21 8mm calculus overlying the lower left kidney  Orders:  -     X-Ray KUB; Future; Expected date: 04/24/2025          Future Appointments   Date Time Provider Department Center   8/27/2025  8:00 AM Aixa Hernández NP [facility] AISHA [facility]       Workup results were reviewed and all questions were answered. Diagnosis and treatment options were discussed and the patient  is amenable with the overall treatment plan. Verbal and written discharge instructions were given including to return to clinic/ED with any acute worsening of symptoms or failure of symptoms to improve. The reasons for return to the clinic/ED were explained in lay terms. No further intervention is warranted at this time. The patient agrees with the plan, expresses understanding, is hemodynamically stable and in no acute distress.     All questions answered to desired level of satisfaction    This note was generated with the assistance of ambient listening technology. Verbal consent was obtained by the patient and accompanying visitor(s) for the recording of patient appointment to facilitate this note. I attest to having reviewed and edited the generated note for accuracy, though some syntax or spelling errors may persist. Please contact the author of this note for any clarification.             GIOVANNI Lance-BC [facility]              [1]   Social History  Tobacco Use    Smoking status: Never     Passive exposure: Never    Smokeless tobacco: Never   Substance Use Topics    Alcohol use: Yes     Comment: very occasionally    Drug use: Never   [2]   Current Outpatient Medications   Medication Sig Dispense  Refill    ketorolac (TORADOL) 10 mg tablet Take 1 tablet (10 mg total) by mouth every 6 (six) hours. for 5 days 20 tablet 0    tamsulosin (FLOMAX) 0.4 mg Cap Take 1 capsule (0.4 mg total) by mouth once daily. 30 capsule 0     No current facility-administered medications for this visit.

## 2025-08-27 ENCOUNTER — OFFICE VISIT (OUTPATIENT)
Dept: FAMILY MEDICINE | Facility: CLINIC | Age: 50
End: 2025-08-27
Payer: COMMERCIAL

## 2025-08-27 VITALS
RESPIRATION RATE: 18 BRPM | HEART RATE: 62 BPM | DIASTOLIC BLOOD PRESSURE: 83 MMHG | TEMPERATURE: 98 F | HEIGHT: 65 IN | SYSTOLIC BLOOD PRESSURE: 123 MMHG | BODY MASS INDEX: 22.43 KG/M2 | WEIGHT: 134.63 LBS

## 2025-08-27 DIAGNOSIS — Z00.01 ENCOUNTER FOR GENERAL ADULT MEDICAL EXAMINATION WITH ABNORMAL FINDINGS: ICD-10-CM

## 2025-08-27 DIAGNOSIS — Z13.220 SCREENING FOR LIPOID DISORDERS: Primary | ICD-10-CM

## 2025-08-27 DIAGNOSIS — Z13.1 SCREENING FOR DIABETES MELLITUS: ICD-10-CM

## 2025-08-27 DIAGNOSIS — Z12.5 SCREENING FOR PROSTATE CANCER: ICD-10-CM

## 2025-08-27 DIAGNOSIS — Z00.00 ROUTINE GENERAL MEDICAL EXAMINATION AT A HEALTH CARE FACILITY: ICD-10-CM

## 2025-08-27 LAB
ALBUMIN SERPL BCP-MCNC: 4 G/DL (ref 3.5–5)
ALBUMIN/GLOB SERPL: 1.1 {RATIO}
ALP SERPL-CCNC: 53 U/L (ref 40–150)
ALT SERPL W P-5'-P-CCNC: 9 U/L
ANION GAP SERPL CALCULATED.3IONS-SCNC: 14 MMOL/L (ref 7–16)
AST SERPL W P-5'-P-CCNC: 23 U/L (ref 11–45)
BASOPHILS # BLD AUTO: 0.06 K/UL (ref 0–0.2)
BASOPHILS NFR BLD AUTO: 0.9 % (ref 0–1)
BILIRUB SERPL-MCNC: 1.6 MG/DL
BUN SERPL-MCNC: 17 MG/DL (ref 8–26)
BUN/CREAT SERPL: 19 (ref 6–20)
CALCIUM SERPL-MCNC: 9.3 MG/DL (ref 8.4–10.2)
CHLORIDE SERPL-SCNC: 103 MMOL/L (ref 98–107)
CHOLEST SERPL-MCNC: 181 MG/DL
CHOLEST/HDLC SERPL: 2.5 {RATIO}
CO2 SERPL-SCNC: 28 MMOL/L (ref 22–29)
CREAT SERPL-MCNC: 0.9 MG/DL (ref 0.72–1.25)
DIFFERENTIAL METHOD BLD: ABNORMAL
EGFR (NO RACE VARIABLE) (RUSH/TITUS): 103 ML/MIN/1.73M2
EOSINOPHIL # BLD AUTO: 0.11 K/UL (ref 0–0.5)
EOSINOPHIL NFR BLD AUTO: 1.6 % (ref 1–4)
ERYTHROCYTE [DISTWIDTH] IN BLOOD BY AUTOMATED COUNT: 12.7 % (ref 11.5–14.5)
EST. AVERAGE GLUCOSE BLD GHB EST-MCNC: 100 MG/DL
GLOBULIN SER-MCNC: 3.8 G/DL (ref 2–4)
GLUCOSE SERPL-MCNC: 83 MG/DL (ref 74–100)
HBA1C MFR BLD HPLC: 5.1 %
HCT VFR BLD AUTO: 49 % (ref 40–54)
HDLC SERPL-MCNC: 72 MG/DL (ref 35–60)
HGB BLD-MCNC: 15.9 G/DL (ref 13.5–18)
IMM GRANULOCYTES # BLD AUTO: 0.02 K/UL (ref 0–0.04)
IMM GRANULOCYTES NFR BLD: 0.3 % (ref 0–0.4)
LDLC SERPL CALC-MCNC: 96 MG/DL
LDLC/HDLC SERPL: 1.3 {RATIO}
LYMPHOCYTES # BLD AUTO: 1.97 K/UL (ref 1–4.8)
LYMPHOCYTES NFR BLD AUTO: 28.5 % (ref 27–41)
MCH RBC QN AUTO: 30.4 PG (ref 27–31)
MCHC RBC AUTO-ENTMCNC: 32.4 G/DL (ref 32–36)
MCV RBC AUTO: 93.7 FL (ref 80–96)
MONOCYTES # BLD AUTO: 0.67 K/UL (ref 0–0.8)
MONOCYTES NFR BLD AUTO: 9.7 % (ref 2–6)
MPC BLD CALC-MCNC: 9.3 FL (ref 9.4–12.4)
NEUTROPHILS # BLD AUTO: 4.08 K/UL (ref 1.8–7.7)
NEUTROPHILS NFR BLD AUTO: 59 % (ref 53–65)
NONHDLC SERPL-MCNC: 109 MG/DL
NRBC # BLD AUTO: 0 X10E3/UL
NRBC, AUTO (.00): 0 %
PLATELET # BLD AUTO: 335 K/UL (ref 150–400)
POTASSIUM SERPL-SCNC: 4.2 MMOL/L (ref 3.5–5.1)
PROT SERPL-MCNC: 7.8 G/DL (ref 6.4–8.3)
PSA SERPL-MCNC: 0.65 NG/ML
RBC # BLD AUTO: 5.23 M/UL (ref 4.6–6.2)
SODIUM SERPL-SCNC: 141 MMOL/L (ref 136–145)
TRIGL SERPL-MCNC: 63 MG/DL (ref 34–140)
VLDLC SERPL-MCNC: 13 MG/DL
WBC # BLD AUTO: 6.91 K/UL (ref 4.5–11)

## 2025-08-27 PROCEDURE — 80061 LIPID PANEL: CPT | Mod: ,,, | Performed by: CLINICAL MEDICAL LABORATORY

## 2025-08-27 PROCEDURE — G0103 PSA SCREENING: HCPCS | Mod: ,,, | Performed by: CLINICAL MEDICAL LABORATORY

## 2025-08-27 PROCEDURE — 85025 COMPLETE CBC W/AUTO DIFF WBC: CPT | Mod: ,,, | Performed by: CLINICAL MEDICAL LABORATORY

## 2025-08-27 PROCEDURE — 83036 HEMOGLOBIN GLYCOSYLATED A1C: CPT | Mod: ,,, | Performed by: CLINICAL MEDICAL LABORATORY

## 2025-08-27 PROCEDURE — 80053 COMPREHEN METABOLIC PANEL: CPT | Mod: ,,, | Performed by: CLINICAL MEDICAL LABORATORY

## 2025-08-28 ENCOUNTER — PATIENT OUTREACH (OUTPATIENT)
Facility: HOSPITAL | Age: 50
End: 2025-08-28
Payer: COMMERCIAL